# Patient Record
Sex: FEMALE | Race: OTHER | HISPANIC OR LATINO | Employment: FULL TIME | URBAN - METROPOLITAN AREA
[De-identification: names, ages, dates, MRNs, and addresses within clinical notes are randomized per-mention and may not be internally consistent; named-entity substitution may affect disease eponyms.]

---

## 2017-05-23 ENCOUNTER — HOSPITAL ENCOUNTER (EMERGENCY)
Facility: HOSPITAL | Age: 27
Discharge: LEFT BEFORE DISCHARGE | End: 2017-05-23
Admitting: EMERGENCY MEDICINE
Payer: COMMERCIAL

## 2017-05-23 VITALS
RESPIRATION RATE: 14 BRPM | HEART RATE: 97 BPM | TEMPERATURE: 98.2 F | WEIGHT: 121 LBS | SYSTOLIC BLOOD PRESSURE: 94 MMHG | OXYGEN SATURATION: 98 % | DIASTOLIC BLOOD PRESSURE: 59 MMHG

## 2017-05-23 LAB — HCG UR QL: NORMAL

## 2017-05-23 PROCEDURE — 81025 URINE PREGNANCY TEST: CPT

## 2017-06-07 ENCOUNTER — APPOINTMENT (OUTPATIENT)
Dept: PHYSICAL THERAPY | Facility: MEDICAL CENTER | Age: 27
End: 2017-06-07
Payer: COMMERCIAL

## 2017-06-07 PROCEDURE — 97161 PT EVAL LOW COMPLEX 20 MIN: CPT

## 2017-06-12 ENCOUNTER — APPOINTMENT (OUTPATIENT)
Dept: PHYSICAL THERAPY | Facility: MEDICAL CENTER | Age: 27
End: 2017-06-12
Payer: COMMERCIAL

## 2017-06-19 ENCOUNTER — APPOINTMENT (OUTPATIENT)
Dept: PHYSICAL THERAPY | Facility: MEDICAL CENTER | Age: 27
End: 2017-06-19
Payer: COMMERCIAL

## 2017-06-26 ENCOUNTER — APPOINTMENT (OUTPATIENT)
Dept: PHYSICAL THERAPY | Facility: MEDICAL CENTER | Age: 27
End: 2017-06-26
Payer: COMMERCIAL

## 2017-06-27 ENCOUNTER — ALLSCRIPTS OFFICE VISIT (OUTPATIENT)
Dept: OTHER | Facility: OTHER | Age: 27
End: 2017-06-27

## 2017-08-10 ENCOUNTER — HOSPITAL ENCOUNTER (EMERGENCY)
Facility: HOSPITAL | Age: 27
Discharge: HOME | End: 2017-08-10
Admitting: EMERGENCY MEDICINE
Payer: COMMERCIAL

## 2017-08-10 VITALS
DIASTOLIC BLOOD PRESSURE: 60 MMHG | SYSTOLIC BLOOD PRESSURE: 100 MMHG | OXYGEN SATURATION: 98 % | WEIGHT: 116 LBS | HEART RATE: 89 BPM | RESPIRATION RATE: 16 BRPM | TEMPERATURE: 98.3 F

## 2017-08-10 DIAGNOSIS — L73.9 FOLLICULITIS: Primary | ICD-10-CM

## 2017-08-10 PROCEDURE — 99282 EMERGENCY DEPT VISIT SF MDM: CPT

## 2017-08-10 RX ORDER — CLINDAMYCIN HYDROCHLORIDE 300 MG/1
300 CAPSULE ORAL 3 TIMES DAILY
Qty: 30 CAPSULE | Refills: 0 | Status: SHIPPED | OUTPATIENT
Start: 2017-08-10 | End: 2017-08-20

## 2017-08-10 RX ORDER — NAPROXEN 500 MG/1
500 TABLET ORAL 2 TIMES DAILY WITH MEALS
Qty: 20 TABLET | Refills: 0 | Status: SHIPPED | OUTPATIENT
Start: 2017-08-10 | End: 2017-11-22 | Stop reason: ALTCHOICE

## 2017-08-10 RX ORDER — BACITRACIN ZINC AND POLYMYXIN B SULFATE 500; 1000 [USP'U]/G; [USP'U]/G
OINTMENT TOPICAL 2 TIMES DAILY
Qty: 15 G | Refills: 0 | Status: SHIPPED | OUTPATIENT
Start: 2017-08-10 | End: 2017-11-22 | Stop reason: ALTCHOICE

## 2017-11-22 ENCOUNTER — HOSPITAL ENCOUNTER (EMERGENCY)
Facility: HOSPITAL | Age: 27
Discharge: HOME | End: 2017-11-22
Attending: EMERGENCY MEDICINE | Admitting: EMERGENCY MEDICINE
Payer: COMMERCIAL

## 2017-11-22 VITALS
OXYGEN SATURATION: 97 % | WEIGHT: 104 LBS | HEART RATE: 113 BPM | SYSTOLIC BLOOD PRESSURE: 117 MMHG | TEMPERATURE: 97.6 F | RESPIRATION RATE: 16 BRPM | DIASTOLIC BLOOD PRESSURE: 77 MMHG

## 2017-11-22 DIAGNOSIS — J20.9 ACUTE BRONCHITIS: Primary | ICD-10-CM

## 2017-11-22 LAB — S PYO AG THROAT QL: NEGATIVE

## 2017-11-22 PROCEDURE — 94640 AIRWAY INHALATION TREATMENT: CPT

## 2017-11-22 PROCEDURE — 87430 STREP A AG IA: CPT | Performed by: EMERGENCY MEDICINE

## 2017-11-22 PROCEDURE — 99283 EMERGENCY DEPT VISIT LOW MDM: CPT

## 2017-11-22 PROCEDURE — 87070 CULTURE OTHR SPECIMN AEROBIC: CPT | Performed by: EMERGENCY MEDICINE

## 2017-11-22 RX ORDER — ALBUTEROL SULFATE 2.5 MG/3ML
5 SOLUTION RESPIRATORY (INHALATION) ONCE
Status: COMPLETED | OUTPATIENT
Start: 2017-11-22 | End: 2017-11-22

## 2017-11-22 RX ORDER — ALBUTEROL SULFATE 90 UG/1
2 AEROSOL, METERED RESPIRATORY (INHALATION) EVERY 4 HOURS PRN
Qty: 1 INHALER | Refills: 0 | Status: SHIPPED | OUTPATIENT
Start: 2017-11-22 | End: 2020-03-06 | Stop reason: ALTCHOICE

## 2017-11-22 RX ORDER — PROMETHAZINE HYDROCHLORIDE AND CODEINE PHOSPHATE 6.25; 1 MG/5ML; MG/5ML
5 SYRUP ORAL EVERY 6 HOURS PRN
Qty: 120 ML | Refills: 0 | Status: SHIPPED | OUTPATIENT
Start: 2017-11-22 | End: 2017-12-02

## 2017-11-22 RX ORDER — CETIRIZINE HYDROCHLORIDE 10 MG/1
10 TABLET ORAL DAILY
Qty: 30 TABLET | Refills: 0 | Status: SHIPPED | OUTPATIENT
Start: 2017-11-22 | End: 2021-04-23

## 2017-11-22 RX ORDER — FLUTICASONE PROPIONATE 50 MCG
1 SPRAY, SUSPENSION (ML) NASAL DAILY
Qty: 16 G | Refills: 0 | Status: SHIPPED | OUTPATIENT
Start: 2017-11-22 | End: 2021-04-23

## 2017-11-22 RX ADMIN — ALBUTEROL SULFATE 5 MG: 2.5 SOLUTION RESPIRATORY (INHALATION) at 07:39

## 2017-11-22 RX ADMIN — DEXAMETHASONE SODIUM PHOSPHATE 10 MG: 10 INJECTION, SOLUTION INTRAMUSCULAR; INTRAVENOUS at 07:38

## 2017-11-22 RX ADMIN — IPRATROPIUM BROMIDE 0.5 MG: 0.5 SOLUTION RESPIRATORY (INHALATION) at 07:39

## 2017-11-22 NOTE — DISCHARGE INSTRUCTIONS
Acute Bronchitis   WHAT YOU NEED TO KNOW:   Acute bronchitis is swelling and irritation in the air passages of your lungs  This irritation may cause you to cough or have other breathing problems  Acute bronchitis often starts because of another illness, such as a cold or the flu  The illness spreads from your nose and throat to your windpipe and airways  Bronchitis is often called a chest cold  Acute bronchitis lasts about 3 to 6 weeks and is usually not a serious illness  Your cough can last for several weeks  DISCHARGE INSTRUCTIONS:   Return to the emergency department if:   · You cough up blood  · Your lips or fingernails turn blue  · You feel like you are not getting enough air when you breathe  Contact your healthcare provider if:   · You have a fever  · Your breathing problems do not go away or get worse  · Your cough does not get better within 4 weeks  · You have questions or concerns about your condition or care  · Avoid irritants in the air  Avoid chemicals, fumes, and dust  Wear a face mask if you must work around dust or fumes  Stay inside on days when air pollution levels are high  If you have allergies, stay inside when pollen counts are high  Do not use aerosol products, such as spray-on deodorant, bug spray, and hair spray  · Do not smoke or be around others who smoke  Nicotine and other chemicals in cigarettes and cigars damages the cilia that move mucus out of your lungs  Ask your healthcare provider for information if you currently smoke and need help to quit  E-cigarettes or smokeless tobacco still contain nicotine  Talk to your healthcare provider before you use these products  · Drink liquids as directed  Liquids help keep your air passages moist and help you cough up mucus  You may need to drink more liquids when you have acute bronchitis  Ask how much liquid to drink each day and which liquids are best for you  · Use a humidifier or vaporizer    Use a cool mist humidifier or a vaporizer to increase air moisture in your home  This may make it easier for you to breathe and help decrease your cough  ·   · Decongestants  help loosen mucus in your lungs and make it easier to cough up  This can help you breathe easier  · Cough suppressants  decrease your urge to cough  If your cough produces mucus, do not take a cough suppressant unless your healthcare provider tells you to  Your healthcare provider may suggest that you take a cough suppressant at night so you can rest     · Inhalers  An inhaler gives you medicine to open your airways

## 2017-11-24 LAB — BACTERIA THROAT CULT: NORMAL

## 2017-12-04 ENCOUNTER — HOSPITAL ENCOUNTER (EMERGENCY)
Facility: HOSPITAL | Age: 27
Discharge: HOME | End: 2017-12-04
Attending: EMERGENCY MEDICINE | Admitting: EMERGENCY MEDICINE
Payer: COMMERCIAL

## 2017-12-04 ENCOUNTER — APPOINTMENT (EMERGENCY)
Dept: CT IMAGING | Facility: HOSPITAL | Age: 27
End: 2017-12-04
Payer: COMMERCIAL

## 2017-12-04 VITALS
WEIGHT: 118.17 LBS | DIASTOLIC BLOOD PRESSURE: 70 MMHG | SYSTOLIC BLOOD PRESSURE: 140 MMHG | HEART RATE: 62 BPM | OXYGEN SATURATION: 100 % | RESPIRATION RATE: 15 BRPM | TEMPERATURE: 98 F

## 2017-12-04 DIAGNOSIS — R10.9 ACUTE ABDOMINAL PAIN: Primary | ICD-10-CM

## 2017-12-04 DIAGNOSIS — M89.9 BONE LESION: ICD-10-CM

## 2017-12-04 DIAGNOSIS — N39.0 ACUTE URINARY TRACT INFECTION: ICD-10-CM

## 2017-12-04 LAB
ALBUMIN SERPL BCP-MCNC: 3.9 G/DL (ref 3.5–5)
ALP SERPL-CCNC: 56 U/L (ref 46–116)
ALT SERPL W P-5'-P-CCNC: 17 U/L (ref 12–78)
ANION GAP SERPL CALCULATED.3IONS-SCNC: 6 MMOL/L (ref 4–13)
AST SERPL W P-5'-P-CCNC: 10 U/L (ref 5–45)
BACTERIA UR QL AUTO: ABNORMAL /HPF
BASOPHILS # BLD AUTO: 0.04 THOUSANDS/ΜL (ref 0–0.1)
BASOPHILS NFR BLD AUTO: 1 % (ref 0–1)
BILIRUB SERPL-MCNC: 0.52 MG/DL (ref 0.2–1)
BILIRUB UR QL STRIP: ABNORMAL
BUN SERPL-MCNC: 11 MG/DL (ref 5–25)
CALCIUM SERPL-MCNC: 9.3 MG/DL (ref 8.3–10.1)
CHLORIDE SERPL-SCNC: 106 MMOL/L (ref 100–108)
CLARITY UR: ABNORMAL
CO2 SERPL-SCNC: 26 MMOL/L (ref 21–32)
COLOR UR: YELLOW
CREAT SERPL-MCNC: 0.67 MG/DL (ref 0.6–1.3)
EOSINOPHIL # BLD AUTO: 0.16 THOUSAND/ΜL (ref 0–0.61)
EOSINOPHIL NFR BLD AUTO: 2 % (ref 0–6)
ERYTHROCYTE [DISTWIDTH] IN BLOOD BY AUTOMATED COUNT: 13.5 % (ref 11.6–15.1)
EXT PREG TEST URINE: NORMAL
GFR SERPL CREATININE-BSD FRML MDRD: 123 ML/MIN/1.73SQ M
GLUCOSE SERPL-MCNC: 89 MG/DL (ref 65–140)
GLUCOSE UR STRIP-MCNC: NEGATIVE MG/DL
HCT VFR BLD AUTO: 39.9 % (ref 34.8–46.1)
HGB BLD-MCNC: 13.6 G/DL (ref 11.5–15.4)
HGB UR QL STRIP.AUTO: ABNORMAL
KETONES UR STRIP-MCNC: NEGATIVE MG/DL
LEUKOCYTE ESTERASE UR QL STRIP: ABNORMAL
LIPASE SERPL-CCNC: 272 U/L (ref 73–393)
LYMPHOCYTES # BLD AUTO: 2.09 THOUSANDS/ΜL (ref 0.6–4.47)
LYMPHOCYTES NFR BLD AUTO: 28 % (ref 14–44)
MCH RBC QN AUTO: 33.9 PG (ref 26.8–34.3)
MCHC RBC AUTO-ENTMCNC: 34.1 G/DL (ref 31.4–37.4)
MCV RBC AUTO: 100 FL (ref 82–98)
MONOCYTES # BLD AUTO: 0.43 THOUSAND/ΜL (ref 0.17–1.22)
MONOCYTES NFR BLD AUTO: 6 % (ref 4–12)
NEUTROPHILS # BLD AUTO: 4.84 THOUSANDS/ΜL (ref 1.85–7.62)
NEUTS SEG NFR BLD AUTO: 63 % (ref 43–75)
NITRITE UR QL STRIP: NEGATIVE
NON-SQ EPI CELLS URNS QL MICRO: ABNORMAL /HPF
NRBC BLD AUTO-RTO: 0 /100 WBCS
PH UR STRIP.AUTO: 5.5 [PH] (ref 4.5–8)
PLATELET # BLD AUTO: 244 THOUSANDS/UL (ref 149–390)
PMV BLD AUTO: 11.4 FL (ref 8.9–12.7)
POTASSIUM SERPL-SCNC: 4.2 MMOL/L (ref 3.5–5.3)
PROT SERPL-MCNC: 7.2 G/DL (ref 6.4–8.2)
PROT UR STRIP-MCNC: ABNORMAL MG/DL
RBC # BLD AUTO: 4.01 MILLION/UL (ref 3.81–5.12)
RBC #/AREA URNS AUTO: ABNORMAL /HPF
SODIUM SERPL-SCNC: 138 MMOL/L (ref 136–145)
SP GR UR STRIP.AUTO: >=1.03 (ref 1–1.03)
UROBILINOGEN UR QL STRIP.AUTO: 0.2 E.U./DL
WBC # BLD AUTO: 7.56 THOUSAND/UL (ref 4.31–10.16)
WBC #/AREA URNS AUTO: ABNORMAL /HPF

## 2017-12-04 PROCEDURE — 96361 HYDRATE IV INFUSION ADD-ON: CPT

## 2017-12-04 PROCEDURE — 81002 URINALYSIS NONAUTO W/O SCOPE: CPT | Performed by: EMERGENCY MEDICINE

## 2017-12-04 PROCEDURE — 83690 ASSAY OF LIPASE: CPT | Performed by: EMERGENCY MEDICINE

## 2017-12-04 PROCEDURE — 85025 COMPLETE CBC W/AUTO DIFF WBC: CPT | Performed by: EMERGENCY MEDICINE

## 2017-12-04 PROCEDURE — 80053 COMPREHEN METABOLIC PANEL: CPT | Performed by: EMERGENCY MEDICINE

## 2017-12-04 PROCEDURE — 99284 EMERGENCY DEPT VISIT MOD MDM: CPT

## 2017-12-04 PROCEDURE — 81001 URINALYSIS AUTO W/SCOPE: CPT

## 2017-12-04 PROCEDURE — 36415 COLL VENOUS BLD VENIPUNCTURE: CPT | Performed by: EMERGENCY MEDICINE

## 2017-12-04 PROCEDURE — 74177 CT ABD & PELVIS W/CONTRAST: CPT

## 2017-12-04 PROCEDURE — 81025 URINE PREGNANCY TEST: CPT | Performed by: EMERGENCY MEDICINE

## 2017-12-04 PROCEDURE — 96374 THER/PROPH/DIAG INJ IV PUSH: CPT

## 2017-12-04 RX ORDER — KETOROLAC TROMETHAMINE 30 MG/ML
15 INJECTION, SOLUTION INTRAMUSCULAR; INTRAVENOUS ONCE
Status: COMPLETED | OUTPATIENT
Start: 2017-12-04 | End: 2017-12-04

## 2017-12-04 RX ORDER — DICLOFENAC POTASSIUM 50 MG/1
50 TABLET, FILM COATED ORAL 3 TIMES DAILY
Qty: 21 TABLET | Refills: 0 | Status: SHIPPED | OUTPATIENT
Start: 2017-12-04 | End: 2021-04-23

## 2017-12-04 RX ORDER — CEPHALEXIN 250 MG/1
500 CAPSULE ORAL 2 TIMES DAILY
Qty: 40 CAPSULE | Refills: 0 | Status: SHIPPED | OUTPATIENT
Start: 2017-12-04 | End: 2017-12-14

## 2017-12-04 RX ADMIN — SODIUM CHLORIDE 1000 ML: 0.9 INJECTION, SOLUTION INTRAVENOUS at 11:57

## 2017-12-04 RX ADMIN — IOHEXOL 100 ML: 350 INJECTION, SOLUTION INTRAVENOUS at 12:54

## 2017-12-04 RX ADMIN — KETOROLAC TROMETHAMINE 15 MG: 30 INJECTION, SOLUTION INTRAMUSCULAR at 12:03

## 2017-12-04 NOTE — ED PROVIDER NOTES
History  Chief Complaint   Patient presents with    Abdominal Pain     Pt reports right sided abdominal pain that started yesterday  Pt reports nausea      A 26-year-old female presents for evaluation of right-sided abdominal pain  She states-the right upper quadrant and feels like it radiates down the right lateral aspect of her side  The pain is described as a sharp sensation which is constant, without modifying factors, moderate severity  Associated with nausea  She denies vomiting, fevers, chills, chest pain, shortness of breath, cough, URI symptoms, urinary complaints, vaginal bleeding or discharge, diarrhea or constipation, fevers, anorexia  History provided by:  Patient  Abdominal Pain   Associated symptoms: nausea    Associated symptoms: no chest pain, no constipation, no diarrhea, no dysuria, no fatigue, no fever, no hematuria, no shortness of breath, no sore throat, no vaginal bleeding, no vaginal discharge and no vomiting        Prior to Admission Medications   Prescriptions Last Dose Informant Patient Reported? Taking? albuterol (PROVENTIL HFA,VENTOLIN HFA) 90 mcg/act inhaler   No No   Sig: Inhale 2 puffs every 4 (four) hours as needed for wheezing (or cough)   cetirizine (ZyrTEC) 10 mg tablet   No No   Sig: Take 1 tablet by mouth daily   fluticasone (FLONASE) 50 mcg/act nasal spray   No No   Si spray into each nostril daily      Facility-Administered Medications: None       Past Medical History:   Diagnosis Date    Fallopian tube disorder     Known health problems: none     Ovarian cyst        History reviewed  No pertinent surgical history  History reviewed  No pertinent family history  I have reviewed and agree with the history as documented  Social History   Substance Use Topics    Smoking status: Never Smoker    Smokeless tobacco: Never Used    Alcohol use No        Review of Systems   Constitutional: Negative for activity change, appetite change, fatigue and fever  HENT: Negative for congestion, dental problem, ear pain, rhinorrhea and sore throat  Eyes: Negative for pain and redness  Respiratory: Negative for chest tightness, shortness of breath and wheezing  Cardiovascular: Negative for chest pain and palpitations  Gastrointestinal: Positive for abdominal pain and nausea  Negative for blood in stool, constipation, diarrhea and vomiting  Endocrine: Negative for cold intolerance and heat intolerance  Genitourinary: Negative for dysuria, flank pain, frequency, hematuria, vaginal bleeding, vaginal discharge and vaginal pain  Musculoskeletal: Negative for arthralgias and myalgias  Skin: Negative for color change, pallor and rash  Neurological: Negative for weakness and numbness  Hematological: Does not bruise/bleed easily  Psychiatric/Behavioral: Negative for agitation, hallucinations and suicidal ideas  Physical Exam  ED Triage Vitals [12/04/17 1022]   Temperature Pulse Respirations Blood Pressure SpO2   98 °F (36 7 °C) 96 16 106/68 98 %      Temp Source Heart Rate Source Patient Position - Orthostatic VS BP Location FiO2 (%)   Oral Monitor Sitting Right arm --      Pain Score       9           Orthostatic Vital Signs  Vitals:    12/04/17 1022 12/04/17 1255   BP: 106/68 140/70   Pulse: 96 62   Patient Position - Orthostatic VS: Sitting        Physical Exam   Constitutional: She is oriented to person, place, and time  She appears well-developed and well-nourished  HENT:   Mouth/Throat: No oropharyngeal exudate  TMs normal bilaterally no pharyngeal erythema no rhinorrhea nontender palpation of sinuses, normal looking turbinates   Eyes: Conjunctivae and EOM are normal    Neck: Normal range of motion  Neck supple  No meningeal signs   Cardiovascular: Normal rate, regular rhythm, normal heart sounds and intact distal pulses  Pulmonary/Chest: Effort normal and breath sounds normal  No respiratory distress  She has no wheezes  She has no rales  She exhibits no tenderness  Abdominal: Soft  Bowel sounds are normal  She exhibits no distension and no mass  There is tenderness (R sided)  There is guarding  There is no rebound  No hernia  No cvat   Musculoskeletal: Normal range of motion  She exhibits no edema  Lymphadenopathy:     She has no cervical adenopathy  Neurological: She is alert and oriented to person, place, and time  No cranial nerve deficit  Skin: No rash noted  No erythema  No edema   Psychiatric: She has a normal mood and affect  Her behavior is normal    Nursing note and vitals reviewed  ED Medications  Medications   sodium chloride 0 9 % bolus 1,000 mL (1,000 mL Intravenous New Bag 12/4/17 1157)   ketorolac (TORADOL) 30 mg/mL injection 15 mg (15 mg Intravenous Given 12/4/17 1203)   iohexol (OMNIPAQUE) 350 MG/ML injection (MULTI-DOSE) 100 mL (100 mL Intravenous Given 12/4/17 1254)       Diagnostic Studies  Results Reviewed     Procedure Component Value Units Date/Time    Comprehensive metabolic panel [[id number]] Collected:  12/04/17 1156    Lab Status:  Final result Specimen:  Blood from Arm, Left Updated:  12/04/17 1225     Sodium 138 mmol/L      Potassium 4 2 mmol/L      Chloride 106 mmol/L      CO2 26 mmol/L      Anion Gap 6 mmol/L      BUN 11 mg/dL      Creatinine 0 67 mg/dL      Glucose 89 mg/dL      Calcium 9 3 mg/dL      AST 10 U/L      ALT 17 U/L      Alkaline Phosphatase 56 U/L      Total Protein 7 2 g/dL      Albumin 3 9 g/dL      Total Bilirubin 0 52 mg/dL      eGFR 123 ml/min/1 73sq m     Narrative:         National Kidney Disease Education Program recommendations are as follows:  GFR calculation is accurate only with a steady state creatinine  Chronic Kidney disease less than 60 ml/min/1 73 sq  meters  Kidney failure less than 15 ml/min/1 73 sq  meters      Lipase [[id number]]  (Normal) Collected:  12/04/17 1156    Lab Status:  Final result Specimen:  Blood from Arm, Left Updated:  12/04/17 1225     Lipase 272 u/L CBC and differential [[id number]]  (Abnormal) Collected:  12/04/17 1156    Lab Status:  Final result Specimen:  Blood from Arm, Left Updated:  12/04/17 1204     WBC 7 56 Thousand/uL      RBC 4 01 Million/uL      Hemoglobin 13 6 g/dL      Hematocrit 39 9 %       (H) fL      MCH 33 9 pg      MCHC 34 1 g/dL      RDW 13 5 %      MPV 11 4 fL      Platelets 285 Thousands/uL      nRBC 0 /100 WBCs      Neutrophils Relative 63 %      Lymphocytes Relative 28 %      Monocytes Relative 6 %      Eosinophils Relative 2 %      Basophils Relative 1 %      Neutrophils Absolute 4 84 Thousands/µL      Lymphocytes Absolute 2 09 Thousands/µL      Monocytes Absolute 0 43 Thousand/µL      Eosinophils Absolute 0 16 Thousand/µL      Basophils Absolute 0 04 Thousands/µL     POCT urinalysis dipstick [[id number]]  (Abnormal) Resulted:  12/04/17 1033    Lab Status:  Final result Updated:  12/04/17 1202    POCT pregnancy, urine [[id number]]  (Normal) Resulted:  12/04/17 1033    Lab Status:  Final result Updated:  12/04/17 1202     EXT PREG TEST UR (Ref: Negative) HCG = neg (-)    Urine Microscopic [[id number]]  (Abnormal) Collected:  12/04/17 1050    Lab Status:  Final result Specimen:  Urine from Urine, Clean Catch Updated:  12/04/17 1057     RBC, UA 2-4 (A) /hpf      WBC, UA 4-10 (A) /hpf      Epithelial Cells Moderate (A) /hpf      Bacteria, UA Occasional /hpf     ED Urine Macroscopic [[id number]]  (Abnormal) Collected:  12/04/17 1050    Lab Status:  Final result Specimen:  Urine Updated:  12/04/17 1033     Color, UA Yellow     Clarity, UA Cloudy     pH, UA 5 5     Leukocytes, UA Small (A)     Nitrite, UA Negative     Protein, UA Trace (A) mg/dl      Glucose, UA Negative mg/dl      Ketones, UA Negative mg/dl      Urobilinogen, UA 0 2 E U /dl      Bilirubin, UA Interference- unable to analyze (A)     Blood, UA Large (A)     Specific Gravity, UA >=1 030    Narrative:       CLIN[facility]K RESULT                 CT abdomen pelvis with contrast   ED Interpretation by Carole Gu MD (12/04 3024)   No acute CT of normality in the abdomen or pelvis to account for the patient's symptoms   Normal appendix  Incidentally discovered approximately 4 2 cm proximal right femoral metaphyseal benign-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   The large size of the lesion suggests possible increased risk of pathologic fracture   Orthopedic    follow-up and follow-up right femur radiographs are recommended  Final Result by Joe Barajas MD (12/04 1317)      No acute CT of normality in the abdomen or pelvis to account for the patient's symptoms  Normal appendix  Incidentally discovered approximately 4 2 cm proximal right femoral metaphyseal benign-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  The large size of the lesion suggests possible increased risk of pathologic fracture  Orthopedic    follow-up and follow-up right femur radiographs are recommended  Workstation performed: [id number]                    Procedures  Procedures       Phone Contacts  ED Phone Contact    ED Course  ED Course as of Dec 04 1342   Mon Dec 04, 2017   1333 Workup reviewed  Patient will be treated for pyelonephritis given symptoms and findings on urine dip  Patient will be treated symptomatically for this  Patient updated on the lesions seen in her pelvis and instructed to follow up with orthopedics and have repeat imaging done as an outpatient  MDM  Number of Diagnoses or Management Options  Diagnosis management comments: Right-sided abdominal pain-will do urine dip, abdominal labs, urine pregnancy, CT abdomen pelvis read acute intra-abdominal pathology, p r n  pain medications      CritCare Time    Disposition  Final diagnoses:   Acute abdominal pain   Acute urinary tract infection   Bone lesion - femur     Time reflects when diagnosis was documented in both MDM as applicable and the Disposition within this note     Time User Action Codes Description Comment    12/4/2017  1:35 PM Marrion Squire Add [R10 9] Acute abdominal pain     12/4/2017  1:35 PM Isidoro Shaverne Erinn Add [N39 0] Acute urinary tract infection     12/4/2017  1:36 PM Marrion Squire Add [M89 9] Bone lesion     12/4/2017  1:38 PM Marrion Squire Modify [M89 9] Bone lesion femur      ED Disposition     ED Disposition Condition Comment    Discharge  Lisa [place] discharge to home/self care  Condition at discharge: Good        Follow-up Information     Follow up With Specialties Details Why R Bessy rCocker 72, CRNP Nurse Practitioner Schedule an appointment as soon as possible for a visit in 2 days  [address] [place]      [address] Orth[facility] Surgery Call today  [facility] [address] [phone]        Patient's Medications   Discharge Prescriptions    CEPHALEXIN (KEFLEX) 250 MG CAPSULE    Take 2 capsules by mouth 2 (two) times a day for 10 days       Start Date: 12/4/2017 End Date: 12/14/2017       Order Dose: 500 mg       Quantity: 40 capsule    Refills: 0    DICLOFENAC POTASSIUM (CATAFLAM) 50 MG TABLET    Take 1 tablet by mouth 3 (three) times a day for 7 days       Start Date: 12/4/2017 End Date: 12/11/2017       Order Dose: 50 mg       Quantity: 21 tablet    Refills: 0     No discharge procedures on file      ED Provider  Electronically Signed by           Army Mary MD  12/04/17 8406

## 2017-12-04 NOTE — DISCHARGE INSTRUCTIONS
Please follow up with orthopedics for further evaluation of bone lesion on your femur including femur x-rays    Acute Abdominal Pain   WHAT YOU NEED TO KNOW:   The cause of your abdominal pain may not be found  If a cause is found, treatment will depend on what the cause is  DISCHARGE INSTRUCTIONS:   Seek care immediately if:   · You vomit blood or cannot stop vomiting  · You have blood in your bowel movement or it looks like tar  · You have bleeding from your rectum  · Your abdomen is larger than usual, more painful, and hard  · You have severe pain in your abdomen  · You stop passing gas and having bowel movements  · You feel weak, dizzy, or faint  Contact your healthcare provider if:   · You have a fever  · You have new signs and symptoms  · Your symptoms do not get better with treatment  · You have questions or concerns about your condition or care  Medicines  may be given to decrease pain, treat an infection, and manage your symptoms  Take your medicine as directed  Call your healthcare provider if you think your medicine is not helping or if you have side effects  Tell him if you are allergic to any medicine  Keep a list of the medicines, vitamins, and herbs you take  Include the amounts, and when and why you take them  Bring the list or the pill bottles to follow-up visits  Carry your medicine list with you in case of an emergency  Manage your symptoms:   · Apply heat  on your abdomen for 20 to 30 minutes every 2 hours for as many days as directed  Heat helps decrease pain and muscle spasms  · Manage your stress  Stress may cause abdominal pain  Your healthcare provider may recommend relaxation techniques and deep breathing exercises to help decrease your stress  Your healthcare provider may recommend you talk to someone about your stress or anxiety, such as a counselor or a trusted friend  Get plenty of sleep and exercise regularly       · Limit or do not drink alcohol  Alcohol can make your abdominal pain worse  Ask your healthcare provider if it is safe for you to drink alcohol  Also ask how much is safe for you to drink  · Do not smoke  Nicotine and other chemicals in cigarettes can damage your esophagus and stomach  Ask your healthcare provider for information if you currently smoke and need help to quit  E-cigarettes or smokeless tobacco still contain nicotine  Talk to your healthcare provider before you use these products  Make changes to the food you eat as directed:  Do not eat foods that cause abdominal pain or other symptoms  Eat small meals more often  · Eat more high-fiber foods if you are constipated  High-fiber foods include fruits, vegetables, whole-grain foods, and legumes  · Do not eat foods that cause gas if you have bloating  Examples include broccoli, cabbage, and cauliflower  Do not drink soda or carbonated drinks, because these may also cause gas  · Do not eat foods or drinks that contain sorbitol or fructose if you have diarrhea and bloating  Some examples are fruit juices, candy, jelly, and sugar-free gum  · Do not eat high-fat foods, such as fried foods, cheeseburgers, hot dogs, and desserts  · Limit or do not drink caffeine  Caffeine may make symptoms, such as heart burn or nausea, worse  · Drink plenty of liquids to prevent dehydration from diarrhea or vomiting  Ask your healthcare provider how much liquid to drink each day and which liquids are best for you  Follow up with your healthcare provider as directed:  Write down your questions so you remember to ask them during your visits  © 2017 [address]  Information is for End User's use only and may not be sold, redistributed or otherwise used for commercial purposes  All illustrations and images included in CareNotes® are the copyrighted property of [employer]  or [employer]uf[employer] Chavez  The above information is an  only   It is not intended as medical advice for individual conditions or treatments  Talk to your doctor, nurse or pharmacist before following any medical regimen to see if it is safe and effective for you  Urinary Tract Infection in Women, Ambulatory Care   GENERAL INFORMATION:   A urinary tract infection (UTI)  is caused by bacteria that get inside your urinary tract  Most bacteria that enter your urinary tract are expelled when you urinate  If the bacteria stay in your urinary tract, you may get an infection  Your urinary tract includes your kidneys, ureters, bladder, and urethra  Urine is made in your kidneys, and it flows from the ureters to the bladder  Urine leaves the bladder through the urethra  A UTI is more common in your lower urinary tract, which includes your bladder and urethra  Common symptoms include the following:   · Urinating more often or waking from sleep to urinate    · Pain or burning when you urinate    · Pain or pressure in your lower abdomen     · Urine that smells bad    · Blood in your urine    · Leaking urine  Seek immediate care for the following symptoms:   · Urinating very little or not at all    · Vomiting    · Shaking chills with a fever    · Side or back pain that gets worse  Treatment for a UTI  may include medicines to treat a bacterial infection  You may also need medicines to decrease pain and burning, or decrease the urge to urinate often  Prevent a UTI:   · Urinate when you feel the urge  Do not hold your urine  Urinate as soon as you feel you have to  · Drink liquids as directed  Ask how much liquid to drink each day and which liquids are best for you  You may need to drink more fluids than usual to help flush out the bacteria  Do not drink alcohol, caffeine, and citrus juices  These can irritate your bladder and increase your symptoms  · Apply heat  on your abdomen for 20 to 30 minutes every 2 hours for as many days as directed   Heat helps decrease discomfort and pressure in your bladder  Follow up with your healthcare provider as directed:  Write down your questions so you remember to ask them during your visits  CARE AGREEMENT:   You have the right to help plan your care  Learn about your health condition and how it may be treated  Discuss treatment options with your caregivers to decide what care you want to receive  You always have the right to refuse treatment  The above information is an  only  It is not intended as medical advice for individual conditions or treatments  Talk to your doctor, nurse or pharmacist before following any medical regimen to see if it is safe and effective for you  © 2014 [address] is for End User's use only and may not be sold, redistributed or otherwise used for commercial purposes  All illustrations and images included in CareNotes® are the copyrighted property of [employer]  or Rufino Chavez  Kidney Infection   WHAT YOU NEED TO KNOW:   A kidney infection, or pyelonephritis, is a bacterial infection  The infection usually starts in your bladder or urethra and moves into your kidney  One or both kidneys may be infected  DISCHARGE INSTRUCTIONS:   Seek care immediately if:   · You have a fever and chills  · You cannot stop vomiting  · You have severe pain in your abdomen, lower back, or sides  Contact your healthcare provider if:   · You continue to have a fever after you take antibiotics for 3 days  · You have pain when you urinate, even after treatment  · Your signs and symptoms return  · You have questions or concerns about your condition or care  Medicines: You may  need any of the following:  · Antibiotics  treat your bacterial infection  · Acetaminophen  decreases pain and fever  It is available without a doctor's order  Ask how much to take and how often to take it  Follow directions   Read the labels of all other medicines you are using to see if they also contain acetaminophen, or ask your doctor or pharmacist  Acetaminophen can cause liver damage if not taken correctly  Do not use more than 4 grams (4,000 milligrams) total of acetaminophen in one day  · NSAIDs , such as ibuprofen, help decrease swelling, pain, and fever  This medicine is available with or without a doctor's order  NSAIDs can cause stomach bleeding or kidney problems in certain people  If you take blood thinner medicine, always ask if NSAIDs are safe for you  Always read the medicine label and follow directions  Do not give these medicines to children under 10months of age without direction from your child's healthcare provider  · Prescription pain medicine  may be given  Ask how to take this medicine safely  · Take your medicine as directed  Contact your healthcare provider if you think your medicine is not helping or if you have side effects  Tell him of her if you are allergic to any medicine  Keep a list of the medicines, vitamins, and herbs you take  Include the amounts, and when and why you take them  Bring the list or the pill bottles to follow-up visits  Carry your medicine list with you in case of an emergency  Drink liquids as directed: You may need to drink extra liquids to help flush your kidneys and urinary system  Water is the best liquid to drink  Ask your healthcare provider how much liquid to drink each day and which liquids are best for you  Urinate as soon as you feel the urge: This will help flush bacteria from your urinary system  Do not wait or hold your urine for too long  Clean your genital area every day with soap and water:  Wipe from front to back after you urinate or have a bowel movement  Wear cotton underwear  Fabrics such as nylon and polyester can stay damp  This can increase your risk for infection  Urinate within 15 minutes after you have sex    Follow up with your healthcare provider as directed:  Write down your questions so you remember to ask them during your visits  © 2017 [address] Information is for End User's use only and may not be sold, redistributed or otherwise used for commercial purposes  All illustrations and images included in CareNotes® are the copyrighted property of [employer]  or [employer]  The above information is an  only  It is not intended as medical advice for individual conditions or treatments  Talk to your doctor, nurse or pharmacist before following any medical regimen to see if it is safe and effective for you

## 2017-12-18 ENCOUNTER — HOSPITAL ENCOUNTER (OUTPATIENT)
Dept: RADIOLOGY | Facility: HOSPITAL | Age: 27
Discharge: HOME | End: 2017-12-18
Payer: COMMERCIAL

## 2017-12-18 ENCOUNTER — GENERIC CONVERSION - ENCOUNTER (OUTPATIENT)
Dept: OTHER | Facility: OTHER | Age: 27
End: 2017-12-18

## 2017-12-18 ENCOUNTER — TRANSCRIBE ORDERS (OUTPATIENT)
Dept: ADMINISTRATIVE | Facility: HOSPITAL | Age: 27
End: 2017-12-18

## 2017-12-18 DIAGNOSIS — R20.2 PARESTHESIA: ICD-10-CM

## 2017-12-18 DIAGNOSIS — R20.2 PARESTHESIA: Primary | ICD-10-CM

## 2017-12-18 DIAGNOSIS — M25.551 RIGHT HIP PAIN: ICD-10-CM

## 2017-12-18 PROCEDURE — 73502 X-RAY EXAM HIP UNI 2-3 VIEWS: CPT

## 2017-12-18 PROCEDURE — 72110 X-RAY EXAM L-2 SPINE 4/>VWS: CPT

## 2018-01-12 NOTE — RESULT NOTES
Verified Results  (Q) ANTI MULLERIAN HORMONE ASSESSR(TM) [id number] 03:01PM Caralyn Alamin     Test Name Result Flag Reference   ANTI MULLERIAN HORMONE$ASSESSR(TM) 5 39 ng/mL     REFERENCE RANGES for AMH/MIS:                             Age         Expected range                                          (ng/mL)         -------------------------------------------         Female:          <14 yrs        0 49-3 15                        14-19 yrs        1 28-16 37                        20-29 yrs        0 76-11 34                        30-39 yrs            <9 24                        40-49 yrs            <4 50                         > 49 yrs            <0 45            Male:            <1  yr         37  67                         1-6  yrs        59  65                         7-11 yrs        40  67                        12-17 yrs           <128 29                         > 17 yrs        1 15-15 23      This test(s) was developed and its performance characteristics   have been determined by [place], [place]  Performance characteristics refer to the analytical  performance of the test  For more information on this test, go   to: [email or web address]     (Q) T4, FREE, DIRECT DIALYSIS AND T4, TOTAL [id number] 03:01PM Caralyn Alamin     Test Name Result Flag Reference   T4, FREE, DIRECT DIALYSIS 0 9 ng/dL  0 8-2 7   Pregnancy Reference Ranges for T4, Free, Direct   Dialysis:     First Trimester:   0 9-2 0 ng/dL  Second Trimester:  0 8-1 5 ng/dL  Third Trimester:   0 8-1 7 ng/dL     This test was developed and its analytical performance  characteristics have been determined by [facility]  It has not been  cleared or approved by FDA  This assay has been validated  pursuant to the CLIA regulations and is used for clinical  purposes     T4, TOTAL 5 5 mcg/dL  4 8-10 4   <1 month: Not Established       1-23 months: 6 0-13 2 mcg/dL        2-12 years: 5 5-12 1 mcg/dL       13-20 years: 5 5-11 1 mcg/dL         >20 years: 4 8-10 4 mcg/dL       Pregnancy     1st Trimester: 6 4-15 2 mcg/dL     2nd Trimester: 7 4-15 2 mcg/dL     3rd Trimester: 7 7-13 8 mcg/dL     All Trimesters          Together: 7 0-14 7 mcg/dL     Conversion factor: 1 mcg/dL = 12 9 nmol/L     (Q) TSH, 3RD GENERATION [id number] 03:01PM Angela First     Test Name Result Flag Reference   TSH 0 67 mIU/L     Reference Range                         > or = 20 Years  0 40-4 50                              Pregnancy Ranges            First trimester    0 26-2 66            Second trimester   0 55-2 73            Third trimester    0 43-2 91     (Q) PROLACTIN [id number] 03:01PM Angela First     Test Name Result Flag Reference   PROLACTIN 5 5 ng/mL     Reference Range   Females          Non-pregnant        3 0-30 0          Pregnant           10 0-209 0          Postmenopausal      2 0-20 0

## 2018-01-14 VITALS
BODY MASS INDEX: 21.71 KG/M2 | HEIGHT: 62 IN | SYSTOLIC BLOOD PRESSURE: 104 MMHG | DIASTOLIC BLOOD PRESSURE: 62 MMHG | WEIGHT: 118 LBS

## 2018-03-30 LAB
CHLAMYDIA TRACHOMATIS DNA SDA, URINE (HISTORICAL): NORMAL
N GONORRHOEAE DNA SDA,URINE (HISTORICAL): NORMAL

## 2018-07-26 ENCOUNTER — OFFICE VISIT (OUTPATIENT)
Dept: FAMILY MEDICINE CLINIC | Facility: CLINIC | Age: 28
End: 2018-07-26
Payer: COMMERCIAL

## 2018-07-26 VITALS
BODY MASS INDEX: 21.19 KG/M2 | HEIGHT: 63 IN | WEIGHT: 119.6 LBS | HEART RATE: 96 BPM | DIASTOLIC BLOOD PRESSURE: 70 MMHG | RESPIRATION RATE: 16 BRPM | SYSTOLIC BLOOD PRESSURE: 100 MMHG | TEMPERATURE: 96.4 F

## 2018-07-26 DIAGNOSIS — Z01.419 ENCNTR FOR GYN EXAM (GENERAL) (ROUTINE) W/O ABN FINDINGS: Primary | ICD-10-CM

## 2018-07-26 DIAGNOSIS — R10.2 PELVIC PAIN: ICD-10-CM

## 2018-07-26 DIAGNOSIS — G47.00 INSOMNIA, UNSPECIFIED TYPE: ICD-10-CM

## 2018-07-26 PROCEDURE — 3725F SCREEN DEPRESSION PERFORMED: CPT | Performed by: NURSE PRACTITIONER

## 2018-07-26 PROCEDURE — 87491 CHLMYD TRACH DNA AMP PROBE: CPT | Performed by: NURSE PRACTITIONER

## 2018-07-26 PROCEDURE — 99395 PREV VISIT EST AGE 18-39: CPT | Performed by: NURSE PRACTITIONER

## 2018-07-26 PROCEDURE — 87591 N.GONORRHOEAE DNA AMP PROB: CPT | Performed by: NURSE PRACTITIONER

## 2018-07-26 NOTE — PATIENT INSTRUCTIONS
Insomnia   WHAT YOU NEED TO KNOW:   What is insomnia? Insomnia is a condition that makes it hard to fall or stay asleep  Lack of sleep can lead to attention or memory problems during the day  You may also be [place], depressed, clumsy, or have headaches  What increases my risk for insomnia? · Older age    · Stress or worry    · A medical condition, such as sleep apnea, GERD, COPD, or asthma    · A mental health condition, such as depression or anxiety    · Blood pressure medicines or antidepressants    · Odd work schedules or frequent travel  How is insomnia diagnosed? Your healthcare provider will ask when your symptoms began and how often you cannot sleep  He will ask if you take any medicines that can cause insomnia, such as blood pressure medicine  He will ask if you have a medical condition, such as GERD, or a mental health condition, such as depression  He may also have you take a survey about your sleep  How is insomnia treated? · Cognitive behavioral therapy (CBT)  helps you find ways to relax, decrease stress, and improve sleep  · Medicines  may help you sleep more regularly or help you feel less anxious  Take them as directed  What can I do to improve my sleep? · Create a sleep schedule  This will help you form a sleep routine  Keep a record of your sleep patterns, and any sleeping problems you have  Bring the record to follow-up visits with healthcare providers  · Do not take naps  Naps could make it hard for you to fall asleep at bedtime  · Keep your bedroom cool, quiet, and dark  Turn on white noise, such as a fan, to help you relax  Do not use your bed for any activity that will keep you awake  Do not read, exercise, eat, or watch TV in your bedroom  · Get up if you do not fall asleep within 20 minutes  Move to another room and do something relaxing until you become sleepy  · Limit caffeine, alcohol, and food to earlier in the day  Only drink caffeine in the morning   Do not drink alcohol within 6 hours of bedtime  Do not eat a heavy meal right before you go to bed  · Exercise regularly  Daily exercise may help you sleep better  Do not exercise within 4 hours of bedtime  When should I contact my healthcare provider? · Your symptoms do not get better, or they get worse  · You begin to use drugs or alcohol to fall asleep  · You have questions or concerns about your condition or care  CARE AGREEMENT:   You have the right to help plan your care  Learn about your health condition and how it may be treated  Discuss treatment options with your caregivers to decide what care you want to receive  You always have the right to refuse treatment  The above information is an  only  It is not intended as medical advice for individual conditions or treatments  Talk to your doctor, nurse or pharmacist before following any medical regimen to see if it is safe and effective for you  © 2017 [employer]  Information is for End User's use only and may not be sold, redistributed or otherwise used for commercial purposes  All illustrations and images included in CareNotes® are the copyrighted property of A [employer] M [employer] Inc  or Rufino Chavez

## 2018-07-26 NOTE — PROGRESS NOTES
Subjective     Clint Flores is a 24 y o   female and is here for routine health maintenance  The patient reports no problems  History of Present Illness     HPI    liliya presents today for her gyn visit  She is having no issues at this time  She is on no birth control at this time  We did discuss prevention  She will think about whether she needs anything at all at this time  She is not interested at the pregnancy at this time  So does the discussion and the decision she will have to make some time  Had testing done about 5 years ago there is 1 block will be into we are not sure which side  Well Adult Physical   Patient here for a Gynecological exam       Diet and Physical Activity  Diet: well balanced diet  Weight concerns: None, patient's BMI is between 18 5-24 9  Exercise: rarely      Depression Screen  PHQ-9 Depression Screening    PHQ-9:    Frequency of the following problems over the past two weeks:       Little interest or pleasure in doing things:  0 - not at all  Feeling down, depressed, or hopeless:  0 - not at all  PHQ-2 Score:  0           History:  LMP: @  Menses regular  yes  Cycle Length 5-6 day every 28 days  Flow normal  Menorrhagia  no  Dysmenorrhea   no  : 0  Para: 0  Breast Fed no  Bottle Fed not asked  Both not asked   Screening  Colononoscopy na  Mammogram na   Pap today  Abnormal pap? no      The following portions of the patient's history were reviewed and updated as appropriate: allergies, current medications, past family history, past medical history, past social history, past surgical history and problem list     Review of Systems     Review of Systems   Constitutional: Negative for activity change, appetite change, fatigue, fever and unexpected weight change  HENT: Negative for congestion, dental problem and sneezing  Eyes: Negative for discharge and visual disturbance  Respiratory: Negative for cough and wheezing      Gastrointestinal: Negative for abdominal pain, constipation, diarrhea, nausea and vomiting  Endocrine: Negative for polydipsia and polyuria  Genitourinary: Negative for dysuria, frequency, urgency and vaginal discharge  Musculoskeletal: Negative for arthralgias  Skin: Negative for rash  Allergic/Immunologic: Negative for environmental allergies and food allergies  Neurological: Negative for headaches  Hematological: Negative for adenopathy  Psychiatric/Behavioral: Negative for behavioral problems and sleep disturbance  The patient is not nervous/anxious  Breast ROS: negative for breast lumps  negative  Self Breast Exam yes  Genito-Urinary ROS: no dysuria, trouble voiding, or hematuria  Hot Flashes not asked  Night Sweats not asked  Vaginal dryness not asked  Insomnia yes  Sexually Active yes  Birth Control Method nothing  Calcium no  Vitamin D not asked  Weight bearing Exercise no  Past Medical History     Past Medical History:   Diagnosis Date    Fallopian tube disorder     Fibroadenoma of left breast 08/28/2008    Known health problems: none     Ovarian cyst        Past Surgical History     Past Surgical History:   Procedure Laterality Date    BREAST SURGERY Left 08/28/2008    lesion excision       Social History     Social History     Social History    Marital status: Single     Spouse name: N/A    Number of children: N/A    Years of education: N/A     Social History Main Topics    Smoking status: Never Smoker    Smokeless tobacco: Never Used    Alcohol use No    Drug use: No    Sexual activity: Not Asked     Other Topics Concern    None     Social History Narrative    None       Family History     History reviewed  No pertinent family history      Current Medications       Current Outpatient Prescriptions:     albuterol (PROVENTIL HFA,VENTOLIN HFA) 90 mcg/act inhaler, Inhale 2 puffs every 4 (four) hours as needed for wheezing (or cough), Disp: 1 Inhaler, Rfl: 0    cetirizine (ZyrTEC) 10 mg tablet, Take 1 tablet by mouth daily, Disp: 30 tablet, Rfl: 0    fluticasone (FLONASE) 50 mcg/act nasal spray, 1 spray into each nostril daily, Disp: 16 g, Rfl: 0    Multiple Vitamins-Minerals (MULTIVITAMIN ADULT PO), Take by mouth, Disp: , Rfl:     diclofenac potassium (CATAFLAM) 50 mg tablet, Take 1 tablet by mouth 3 (three) times a day for 7 days, Disp: 21 tablet, Rfl: 0     Allergies     Allergies   Allergen Reactions    No Active Allergies        Objective     /70 (BP Location: Left arm, Patient Position: Sitting, Cuff Size: Adult)   Pulse 96   Temp (!) 96 4 °F (35 8 °C) (Temporal)   Resp 16   Ht 5' 2 6" (1 59 m)   Wt 54 3 kg (119 lb 9 6 oz)   LMP 07/13/2018   BMI 21 46 kg/m²      Physical Exam   Constitutional: She is oriented to person, place, and time  She appears well-developed and well-nourished  HENT:   Head: Normocephalic  Right Ear: External ear normal    Left Ear: External ear normal    Nose: Nose normal    Eyes: Conjunctivae are normal  Pupils are equal, round, and reactive to light  Right eye exhibits no discharge  Left eye exhibits no discharge  Neck: Normal range of motion  Neck supple  No thyromegaly present  Cardiovascular: Normal rate, regular rhythm and normal heart sounds  No murmur heard  Pulmonary/Chest: Effort normal and breath sounds normal    Abdominal: Soft  Bowel sounds are normal  There is no tenderness  Genitourinary: Vagina normal and uterus normal  No breast swelling, tenderness, discharge or bleeding  There is no rash or tenderness on the right labia  There is no rash or tenderness on the left labia  Cervix exhibits no motion tenderness and no discharge  Right adnexum displays tenderness  Left adnexum displays no tenderness  No tenderness in the vagina  Musculoskeletal: Normal range of motion  Lymphadenopathy:     She has no cervical adenopathy  Right: No inguinal adenopathy present  Left: No inguinal adenopathy present     Neurological: She is alert and oriented to person, place, and time  Skin: Skin is warm  No rash noted  Psychiatric: She has a normal mood and affect  Her behavior is normal          No exam data present    Health Maintenance     Health Maintenance   Topic Date Due    HIV SCREENING  1992    Depression Screening PHQ-9  1992    DTaP,Tdap,and Td Vaccines (1 - Tdap) 11/27/2013    INFLUENZA VACCINE  09/01/2018       There is no immunization history on file for this patient      Assessment/Plan     1Healthy well female  Waiting on PAP/HPV results  Call if she wants to start some form of birthcontrol  2 pelvic pain   U/s ordered  3Insomnia  Start on melatonin    XU Tarango

## 2018-07-27 LAB
CHLAMYDIA DNA CVX QL NAA+PROBE: NORMAL
N GONORRHOEA DNA GENITAL QL NAA+PROBE: NORMAL

## 2018-07-30 ENCOUNTER — TELEPHONE (OUTPATIENT)
Dept: FAMILY MEDICINE CLINIC | Facility: CLINIC | Age: 28
End: 2018-07-30

## 2018-07-30 NOTE — TELEPHONE ENCOUNTER
Sammie from [facility]'s lab called said that the order for the pap that was sent is wrong needs correct code which is lab 698 any ?  Call sammie at [phone]   [place] [address]

## 2018-07-31 ENCOUNTER — TELEPHONE (OUTPATIENT)
Dept: FAMILY MEDICINE CLINIC | Facility: CLINIC | Age: 28
End: 2018-07-31

## 2018-07-31 NOTE — TELEPHONE ENCOUNTER
Call from Binta at [facility] calling to check the status on the order for the pap that was sent is incorrectly  Golden Rogers states that the correct code is lab 4   The order was sent with a [facility] code instead of a [facility] code  Please advise Binta when this is corrected at [phone] thank you

## 2019-05-13 ENCOUNTER — APPOINTMENT (EMERGENCY)
Dept: CT IMAGING | Facility: HOSPITAL | Age: 29
End: 2019-05-13
Payer: COMMERCIAL

## 2019-05-13 ENCOUNTER — HOSPITAL ENCOUNTER (EMERGENCY)
Facility: HOSPITAL | Age: 29
Discharge: HOME | End: 2019-05-13
Attending: EMERGENCY MEDICINE | Admitting: EMERGENCY MEDICINE
Payer: COMMERCIAL

## 2019-05-13 ENCOUNTER — APPOINTMENT (EMERGENCY)
Dept: RADIOLOGY | Facility: HOSPITAL | Age: 29
End: 2019-05-13
Payer: COMMERCIAL

## 2019-05-13 VITALS
BODY MASS INDEX: 21.12 KG/M2 | RESPIRATION RATE: 18 BRPM | DIASTOLIC BLOOD PRESSURE: 58 MMHG | WEIGHT: 117.73 LBS | OXYGEN SATURATION: 99 % | TEMPERATURE: 98.4 F | HEART RATE: 66 BPM | SYSTOLIC BLOOD PRESSURE: 104 MMHG

## 2019-05-13 DIAGNOSIS — S16.1XXA ACUTE STRAIN OF NECK MUSCLE, INITIAL ENCOUNTER: ICD-10-CM

## 2019-05-13 DIAGNOSIS — S80.02XA CONTUSION OF LEFT KNEE, INITIAL ENCOUNTER: ICD-10-CM

## 2019-05-13 DIAGNOSIS — S30.0XXA CONTUSION OF LOWER BACK, INITIAL ENCOUNTER: ICD-10-CM

## 2019-05-13 DIAGNOSIS — S09.90XA CLOSED HEAD INJURY, INITIAL ENCOUNTER: Primary | ICD-10-CM

## 2019-05-13 PROCEDURE — 72100 X-RAY EXAM L-S SPINE 2/3 VWS: CPT

## 2019-05-13 PROCEDURE — 72125 CT NECK SPINE W/O DYE: CPT

## 2019-05-13 PROCEDURE — 99284 EMERGENCY DEPT VISIT MOD MDM: CPT

## 2019-05-13 PROCEDURE — 70450 CT HEAD/BRAIN W/O DYE: CPT

## 2019-05-13 PROCEDURE — 73564 X-RAY EXAM KNEE 4 OR MORE: CPT

## 2019-05-13 PROCEDURE — 99284 EMERGENCY DEPT VISIT MOD MDM: CPT | Performed by: EMERGENCY MEDICINE

## 2019-05-13 RX ORDER — CYCLOBENZAPRINE HCL 10 MG
10 TABLET ORAL 2 TIMES DAILY PRN
Qty: 15 TABLET | Refills: 0 | Status: SHIPPED | OUTPATIENT
Start: 2019-05-13 | End: 2020-03-06 | Stop reason: ALTCHOICE

## 2019-05-13 RX ORDER — IBUPROFEN 400 MG/1
400 TABLET ORAL EVERY 8 HOURS PRN
Qty: 20 TABLET | Refills: 0 | Status: SHIPPED | OUTPATIENT
Start: 2019-05-13 | End: 2020-03-06 | Stop reason: ALTCHOICE

## 2019-05-13 RX ORDER — ACETAMINOPHEN 325 MG/1
975 TABLET ORAL ONCE
Status: COMPLETED | OUTPATIENT
Start: 2019-05-13 | End: 2019-05-13

## 2019-05-13 RX ADMIN — ACETAMINOPHEN 975 MG: 325 TABLET ORAL at 11:59

## 2019-09-10 ENCOUNTER — HOSPITAL ENCOUNTER (EMERGENCY)
Facility: HOSPITAL | Age: 29
Discharge: HOME | End: 2019-09-10
Attending: EMERGENCY MEDICINE

## 2019-09-10 VITALS
WEIGHT: 123.02 LBS | HEART RATE: 106 BPM | RESPIRATION RATE: 18 BRPM | SYSTOLIC BLOOD PRESSURE: 114 MMHG | OXYGEN SATURATION: 97 % | DIASTOLIC BLOOD PRESSURE: 66 MMHG | BODY MASS INDEX: 22.07 KG/M2 | TEMPERATURE: 100.4 F

## 2019-09-10 DIAGNOSIS — J02.9 PHARYNGITIS: Primary | ICD-10-CM

## 2019-09-10 PROCEDURE — 99282 EMERGENCY DEPT VISIT SF MDM: CPT

## 2019-09-10 PROCEDURE — 99284 EMERGENCY DEPT VISIT MOD MDM: CPT | Performed by: PHYSICIAN ASSISTANT

## 2019-09-10 RX ORDER — AMOXICILLIN 500 MG/1
500 TABLET, FILM COATED ORAL 2 TIMES DAILY
Qty: 20 TABLET | Refills: 0 | Status: SHIPPED | OUTPATIENT
Start: 2019-09-10 | End: 2019-09-20

## 2019-09-10 NOTE — ED PROVIDER NOTES
History  Chief Complaint   Patient presents with    Sore Throat     Sore throat and fever for 2 days     28y  o female with PMH of fibroadenoma and ovarian cyst presents to the ER for sore throat and subjective fever for 2 days  Patient took Tylenol yesterday for fever  She describes her pain as sharp and radiating to her ears  She rates her pain 10/10 and states it is constant  She denies sick contacts or recent travel  She denies chills, rhinorrhea/congestion, chest pain, dyspnea, N/V/D, abdominal pain, weakness or paresthesias  History provided by:  Patient   used: No        Prior to Admission Medications   Prescriptions Last Dose Informant Patient Reported? Taking? Multiple Vitamins-Minerals (MULTIVITAMIN ADULT PO)  Self Yes No   Sig: Take by mouth   albuterol (PROVENTIL HFA,VENTOLIN HFA) 90 mcg/act inhaler  Self No No   Sig: Inhale 2 puffs every 4 (four) hours as needed for wheezing (or cough)   Patient not taking: Reported on 2019   cetirizine (ZyrTEC) 10 mg tablet  Self No No   Sig: Take 1 tablet by mouth daily   cyclobenzaprine (FLEXERIL) 10 mg tablet   No No   Sig: Take 1 tablet (10 mg total) by mouth 2 (two) times a day as needed for muscle spasms   diclofenac potassium (CATAFLAM) 50 mg tablet   No No   Sig: Take 1 tablet by mouth 3 (three) times a day for 7 days   fluticasone (FLONASE) 50 mcg/act nasal spray  Self No No   Si spray into each nostril daily   ibuprofen (MOTRIN) 400 mg tablet   No No   Sig: Take 1 tablet (400 mg total) by mouth every 8 (eight) hours as needed for moderate pain      Facility-Administered Medications: None       Past Medical History:   Diagnosis Date    Fallopian tube disorder     Fibroadenoma of left breast 2008    Known health problems: none     Ovarian cyst        Past Surgical History:   Procedure Laterality Date    BREAST SURGERY Left 2008    lesion excision       History reviewed  No pertinent family history    I have reviewed and agree with the history as documented  Social History     Tobacco Use    Smoking status: Never Smoker    Smokeless tobacco: Never Used   Substance Use Topics    Alcohol use: Yes     Comment: occ   Drug use: No        Review of Systems   Constitutional: Positive for fever  Negative for activity change, appetite change and chills  HENT: Positive for ear pain and sore throat  Negative for congestion, drooling, ear discharge, facial swelling and rhinorrhea  Eyes: Negative for redness  Respiratory: Negative for cough and shortness of breath  Cardiovascular: Negative for chest pain  Gastrointestinal: Negative for abdominal pain, diarrhea, nausea and vomiting  Skin: Negative for rash  Allergic/Immunologic: Negative for food allergies  Neurological: Negative for weakness and numbness  Physical Exam  Physical Exam   Constitutional:  Non-toxic appearance  No distress  HENT:   Head: Normocephalic and atraumatic  Right Ear: Tympanic membrane, external ear and ear canal normal  No drainage, swelling or tenderness  No foreign bodies  Tympanic membrane is not erythematous  No hemotympanum  Left Ear: Tympanic membrane, external ear and ear canal normal  No drainage, swelling or tenderness  No foreign bodies  Tympanic membrane is not erythematous  No hemotympanum  Nose: Nose normal    Mouth/Throat: Uvula is midline and mucous membranes are normal  No uvula swelling  Posterior oropharyngeal edema and posterior oropharyngeal erythema present  No tonsillar abscesses  Tonsillar exudate  Neck: Normal range of motion and phonation normal  Neck supple  No tracheal deviation present  Cardiovascular: Normal rate, regular rhythm, S1 normal, S2 normal and normal heart sounds  Exam reveals no gallop and no friction rub  No murmur heard  Pulmonary/Chest: Effort normal and breath sounds normal  No respiratory distress  She has no decreased breath sounds  She has no wheezes   She has no rhonchi  She has no rales  She exhibits no tenderness  Neurological: She is alert  GCS eye subscore is 4  GCS verbal subscore is 5  GCS motor subscore is 6  Skin: Skin is warm and dry  No rash noted  Psychiatric: She has a normal mood and affect  Nursing note and vitals reviewed  Vital Signs  ED Triage Vitals [09/10/19 0702]   Temperature Pulse Respirations Blood Pressure SpO2   100 4 °F (38 °C) (!) 112 18 114/66 97 %      Temp Source Heart Rate Source Patient Position - Orthostatic VS BP Location FiO2 (%)   Oral Monitor -- -- --      Pain Score       4           Vitals:    09/10/19 0702 09/10/19 0717   BP: 114/66    Pulse: (!) 112 (!) 106         Visual Acuity      ED Medications  Medications - No data to display    Diagnostic Studies  Results Reviewed     None                 No orders to display              Procedures  Procedures       ED Course                               MDM  Number of Diagnoses or Management Options  Pharyngitis: new and does not require workup  Diagnosis management comments: DDX consists of but not limited to: viral syndrome, strep, abscess, mono    At discharge, I instructed the patient to:  -follow up with pcp  -take Tylenol or Motrin for pain or fever  -take Amoxicillin as prescribed  -rest and drink plenty of fluids  -return to the ER if symptoms worsened or new symptoms arose  Patient agreed to this plan and was stable at time of discharge  Patient Progress  Patient progress: stable      Disposition  Final diagnoses:   Pharyngitis     Time reflects when diagnosis was documented in both MDM as applicable and the Disposition within this note     Time User Action Codes Description Comment    9/10/2019  7:16 AM Marcos LEGER Add [J02 9] Pharyngitis       ED Disposition     ED Disposition Condition Date/Time Comment    Discharge Stable Tue Sep 10, 2019  7:16 AM Tabatha Castro discharge to home/self care              Follow-up Information     Follow up With Specialties Details Why Contact Info    XU Benitez Nurse Practitioner Schedule an appointment as soon as possible for a visit    [place] [address]  [phone]            Patient's Medications   Discharge Prescriptions    AMOXICILLIN (AMOXIL) 500 MG TABLET    Take 1 tablet (500 mg total) by mouth 2 (two) times a day for 10 days       Start Date: 9/10/2019 End Date: 9/20/2019       Order Dose: 500 mg       Quantity: 20 tablet    Refills: 0     No discharge procedures on file      ED Provider  Electronically Signed by           Radha Biswas PA-C  09/10/19 7227

## 2019-09-10 NOTE — DISCHARGE INSTRUCTIONS
DISCHARGE INSTRUCTIONS:    FOLLOW UP WITH YOUR PRIMARY CARE PROVIDER OR THE [address] [place] [address]  MAKE AN APPOINTMENT TO BE SEEN  TAKE TYLENOL OR MOTRIN FOR PAIN OR FEVER  TAKE AMOXICILLIN AS PRESCRIBED  IF RASH, SHORTNESS OF BREATH OR TROUBLE SWALLOWING, STOP TAKING THE MEDICATION AND BE SEEN  REST AND DRINK PLENTY OF FLUIDS  IF SYMPTOMS WORSEN OR NEW SYMPTOMS ARISE, RETURN TO THE ER TO BE SEEN

## 2019-12-16 DIAGNOSIS — Z23 NEED FOR VACCINATION: ICD-10-CM

## 2019-12-16 DIAGNOSIS — Z11.4 SCREENING FOR HIV (HUMAN IMMUNODEFICIENCY VIRUS): Primary | ICD-10-CM

## 2019-12-18 ENCOUNTER — OFFICE VISIT (OUTPATIENT)
Dept: FAMILY MEDICINE CLINIC | Facility: CLINIC | Age: 29
End: 2019-12-18
Payer: COMMERCIAL

## 2019-12-18 VITALS
RESPIRATION RATE: 16 BRPM | HEART RATE: 90 BPM | SYSTOLIC BLOOD PRESSURE: 108 MMHG | BODY MASS INDEX: 24.7 KG/M2 | OXYGEN SATURATION: 96 % | DIASTOLIC BLOOD PRESSURE: 76 MMHG | HEIGHT: 61 IN | TEMPERATURE: 98.8 F | WEIGHT: 130.8 LBS

## 2019-12-18 DIAGNOSIS — F51.01 PRIMARY INSOMNIA: ICD-10-CM

## 2019-12-18 DIAGNOSIS — Z00.00 ANNUAL PHYSICAL EXAM: Primary | ICD-10-CM

## 2019-12-18 DIAGNOSIS — Z23 NEED FOR VACCINATION: ICD-10-CM

## 2019-12-18 DIAGNOSIS — Z11.4 SCREENING FOR HIV (HUMAN IMMUNODEFICIENCY VIRUS): ICD-10-CM

## 2019-12-18 DIAGNOSIS — Z63.4 DEATH OF FAMILY MEMBER: ICD-10-CM

## 2019-12-18 DIAGNOSIS — N97.9 FEMALE INFERTILITY: ICD-10-CM

## 2019-12-18 LAB
ALBUMIN SERPL BCP-MCNC: 4.2 G/DL (ref 3.5–5)
ALP SERPL-CCNC: 60 U/L (ref 46–116)
ALT SERPL W P-5'-P-CCNC: 22 U/L (ref 12–78)
ANION GAP SERPL CALCULATED.3IONS-SCNC: 8 MMOL/L (ref 4–13)
AST SERPL W P-5'-P-CCNC: 10 U/L (ref 5–45)
BASOPHILS # BLD AUTO: 0.09 THOUSANDS/ΜL (ref 0–0.1)
BASOPHILS NFR BLD AUTO: 1 % (ref 0–1)
BILIRUB SERPL-MCNC: 0.44 MG/DL (ref 0.2–1)
BUN SERPL-MCNC: 12 MG/DL (ref 5–25)
CALCIUM SERPL-MCNC: 9 MG/DL (ref 8.3–10.1)
CHLORIDE SERPL-SCNC: 108 MMOL/L (ref 100–108)
CHOLEST SERPL-MCNC: 184 MG/DL (ref 50–200)
CO2 SERPL-SCNC: 23 MMOL/L (ref 21–32)
CREAT SERPL-MCNC: 0.76 MG/DL (ref 0.6–1.3)
EOSINOPHIL # BLD AUTO: 0.32 THOUSAND/ΜL (ref 0–0.61)
EOSINOPHIL NFR BLD AUTO: 5 % (ref 0–6)
ERYTHROCYTE [DISTWIDTH] IN BLOOD BY AUTOMATED COUNT: 13.7 % (ref 11.6–15.1)
GFR SERPL CREATININE-BSD FRML MDRD: 108 ML/MIN/1.73SQ M
GLUCOSE P FAST SERPL-MCNC: 70 MG/DL (ref 65–99)
HCT VFR BLD AUTO: 45.2 % (ref 34.8–46.1)
HDLC SERPL-MCNC: 50 MG/DL
HGB BLD-MCNC: 14.5 G/DL (ref 11.5–15.4)
IMM GRANULOCYTES # BLD AUTO: 0.02 THOUSAND/UL (ref 0–0.2)
IMM GRANULOCYTES NFR BLD AUTO: 0 % (ref 0–2)
LDLC SERPL CALC-MCNC: 124 MG/DL (ref 0–100)
LYMPHOCYTES # BLD AUTO: 1.79 THOUSANDS/ΜL (ref 0.6–4.47)
LYMPHOCYTES NFR BLD AUTO: 28 % (ref 14–44)
MCH RBC QN AUTO: 33.4 PG (ref 26.8–34.3)
MCHC RBC AUTO-ENTMCNC: 32.1 G/DL (ref 31.4–37.4)
MCV RBC AUTO: 104 FL (ref 82–98)
MONOCYTES # BLD AUTO: 0.49 THOUSAND/ΜL (ref 0.17–1.22)
MONOCYTES NFR BLD AUTO: 8 % (ref 4–12)
NEUTROPHILS # BLD AUTO: 3.59 THOUSANDS/ΜL (ref 1.85–7.62)
NEUTS SEG NFR BLD AUTO: 58 % (ref 43–75)
NONHDLC SERPL-MCNC: 134 MG/DL
NRBC BLD AUTO-RTO: 0 /100 WBCS
PLATELET # BLD AUTO: 324 THOUSANDS/UL (ref 149–390)
PMV BLD AUTO: 11.6 FL (ref 8.9–12.7)
POTASSIUM SERPL-SCNC: 4.1 MMOL/L (ref 3.5–5.3)
PROT SERPL-MCNC: 7.6 G/DL (ref 6.4–8.2)
RBC # BLD AUTO: 4.34 MILLION/UL (ref 3.81–5.12)
SODIUM SERPL-SCNC: 139 MMOL/L (ref 136–145)
TRIGL SERPL-MCNC: 48 MG/DL
TSH SERPL DL<=0.05 MIU/L-ACNC: 1.61 UIU/ML (ref 0.36–3.74)
WBC # BLD AUTO: 6.3 THOUSAND/UL (ref 4.31–10.16)

## 2019-12-18 PROCEDURE — 84443 ASSAY THYROID STIM HORMONE: CPT | Performed by: NURSE PRACTITIONER

## 2019-12-18 PROCEDURE — 80061 LIPID PANEL: CPT | Performed by: NURSE PRACTITIONER

## 2019-12-18 PROCEDURE — 85025 COMPLETE CBC W/AUTO DIFF WBC: CPT | Performed by: NURSE PRACTITIONER

## 2019-12-18 PROCEDURE — 99395 PREV VISIT EST AGE 18-39: CPT | Performed by: NURSE PRACTITIONER

## 2019-12-18 PROCEDURE — 90651 9VHPV VACCINE 2/3 DOSE IM: CPT

## 2019-12-18 PROCEDURE — 87389 HIV-1 AG W/HIV-1&-2 AB AG IA: CPT | Performed by: NURSE PRACTITIONER

## 2019-12-18 PROCEDURE — 90471 IMMUNIZATION ADMIN: CPT

## 2019-12-18 PROCEDURE — 36415 COLL VENOUS BLD VENIPUNCTURE: CPT | Performed by: NURSE PRACTITIONER

## 2019-12-18 PROCEDURE — 80053 COMPREHEN METABOLIC PANEL: CPT | Performed by: NURSE PRACTITIONER

## 2019-12-18 SDOH — SOCIAL STABILITY - SOCIAL INSECURITY: DISSAPEARANCE AND DEATH OF FAMILY MEMBER: Z63.4

## 2019-12-18 NOTE — PATIENT INSTRUCTIONS

## 2019-12-18 NOTE — PROGRESS NOTES
[address]    NAME: Irvin Guzman  AGE: 34 y o  SEX: female  : 1990     DATE: 2019     Assessment and Plan:     Problem List Items Addressed This Visit        Genitourinary    Female infertility    Relevant Orders    Ambulatory referral to Obstetrics / Gynecology      Other Visit Diagnoses     Annual physical exam    -  Primary    Relevant Orders    CBC and differential    Lipid panel    Comprehensive metabolic panel    Primary insomnia        Relevant Orders    TSH, 3rd generation with Free T4 reflex    Need for vaccination        Relevant Orders    HPV VACCINE 9 VALENT IM    Screening for HIV (human immunodeficiency virus)        Relevant Orders    HIV 1/2 AG-AB combo    Death of family member     Advised counseling            Immunizations and preventive care screenings were discussed with patient today  Appropriate education was printed on patient's after visit summary  Counseling:  Dental Health: discussed importance of regular tooth brushing, flossing, and dental visits  Sexual health: discussed sexually transmitted diseases, partner selection, use of condoms, avoidance of unintended pregnancy, and contraceptive alternatives  · Exercise: the importance of regular exercise/physical activity was discussed  Recommend exercise 3-5 times per week for at least 30 minutes  Return in about 1 year (around 2020) for Annual physical      Chief Complaint:     Chief Complaint   Patient presents with    Annual Exam      History of Present Illness:     Adult Annual Physical   Patient here for a comprehensive physical exam  The patient reports problems - having issues with sleeping   Having trouble falling asleep  also waking up   Nightly       Diet and Physical Activity  · Diet/Nutrition: well balanced diet and consuming 3-5 servings of fruits/vegetables daily  · Exercise: no formal exercise  Depression Screening  PHQ-9 Depression Screening    PHQ-9:    Frequency of the following problems over the past two weeks:       Little interest or pleasure in doing things:  0 - not at all  Feeling down, depressed, or hopeless:  0 - not at all  PHQ-2 Score:  0       General Health  · Sleep: sleeps poorly  · Hearing: normal - bilateral   · Vision: no vision problems  · Dental: regular dental visits  /GYN Health  · Last menstrual period: today  · Contraceptive method: none  · History of STDs?: yes  treated     Review of Systems:     Review of Systems   Constitutional: Negative for activity change, appetite change, fatigue, fever and unexpected weight change  HENT: Negative for congestion, dental problem and sneezing  Eyes: Negative for discharge and visual disturbance  Respiratory: Negative for cough and wheezing  Cardiovascular: Negative for chest pain  Gastrointestinal: Negative for abdominal pain, constipation, diarrhea, nausea and vomiting  Endocrine: Negative for polydipsia and polyuria  Genitourinary: Negative for dysuria and frequency  Musculoskeletal: Negative for arthralgias  Skin: Negative for rash  Allergic/Immunologic: Negative for environmental allergies and food allergies  Neurological: Negative for dizziness, numbness and headaches  Hematological: Negative for adenopathy  Psychiatric/Behavioral: Positive for sleep disturbance  Negative for behavioral problems  The patient is not nervous/anxious         Past Medical History:     Past Medical History:   Diagnosis Date    Fallopian tube disorder     Fibroadenoma of left breast 08/28/2008    Known health problems: none     Ovarian cyst       Past Surgical History:     Past Surgical History:   Procedure Laterality Date    BREAST SURGERY Left 08/28/2008    lesion excision      Social History:     Social History     Socioeconomic History    Marital status: Single     Spouse name: None    Number of children: None    Years of education: None    Highest education level: None   Occupational History    None   Social Needs    Financial resource strain: None    Food insecurity:     Worry: None     Inability: None    Transportation needs:     Medical: None     Non-medical: None   Tobacco Use    Smoking status: Never Smoker    Smokeless tobacco: Never Used   Substance and Sexual Activity    Alcohol use: Yes     Comment: occ      Drug use: No    Sexual activity: Yes     Partners: Male     Birth control/protection: Condom Male   Lifestyle    Physical activity:     Days per week: None     Minutes per session: None    Stress: None   Relationships    Social connections:     Talks on phone: None     Gets together: None     Attends Yarsani service: None     Active member of club or organization: None     Attends meetings of clubs or organizations: None     Relationship status: None    Intimate partner violence:     Fear of current or ex partner: None     Emotionally abused: None     Physically abused: None     Forced sexual activity: None   Other Topics Concern    None   Social History Narrative    None      Family History:     Family History   Problem Relation Age of Onset    No Known Problems Mother     No Known Problems Father       Current Medications:     Current Outpatient Medications   Medication Sig Dispense Refill    albuterol (PROVENTIL HFA,VENTOLIN HFA) 90 mcg/act inhaler Inhale 2 puffs every 4 (four) hours as needed for wheezing (or cough) (Patient not taking: Reported on 5/13/2019) 1 Inhaler 0    cetirizine (ZyrTEC) 10 mg tablet Take 1 tablet by mouth daily 30 tablet 0    cyclobenzaprine (FLEXERIL) 10 mg tablet Take 1 tablet (10 mg total) by mouth 2 (two) times a day as needed for muscle spasms (Patient not taking: Reported on 12/18/2019) 15 tablet 0    diclofenac potassium (CATAFLAM) 50 mg tablet Take 1 tablet by mouth 3 (three) times a day for 7 days 21 tablet 0    fluticasone (FLONASE) 50 mcg/act nasal spray 1 spray into each nostril daily 16 g 0    ibuprofen (MOTRIN) 400 mg tablet Take 1 tablet (400 mg total) by mouth every 8 (eight) hours as needed for moderate pain (Patient not taking: Reported on 12/18/2019) 20 tablet 0    Multiple Vitamins-Minerals (MULTIVITAMIN ADULT PO) Take by mouth       No current facility-administered medications for this visit  Allergies:     No Known Allergies   Physical Exam:     /76 (BP Location: Left arm, Patient Position: Sitting, Cuff Size: Adult)   Pulse 90   Temp 98 8 °F (37 1 °C) (Tympanic)   Resp 16   Ht 5' 1" (1 549 m)   Wt 59 3 kg (130 lb 12 8 oz)   SpO2 96%   BMI 24 71 kg/m²     Physical Exam   Constitutional: She is oriented to person, place, and time  She appears well-developed and well-nourished  HENT:   Head: Normocephalic  Right Ear: External ear normal    Left Ear: External ear normal    Nose: Nose normal    Eyes: Pupils are equal, round, and reactive to light  Conjunctivae are normal  Right eye exhibits no discharge  Left eye exhibits no discharge  Neck: Normal range of motion  Neck supple  No thyromegaly present  Cardiovascular: Normal rate, regular rhythm and normal heart sounds  No murmur heard  Pulmonary/Chest: Effort normal and breath sounds normal    Abdominal: Soft  Bowel sounds are normal  There is no tenderness  Musculoskeletal: Normal range of motion  Lymphadenopathy:     She has no cervical adenopathy  Neurological: She is alert and oriented to person, place, and time  Skin: Skin is warm  No rash noted  Psychiatric: She has a normal mood and affect  Her behavior is normal    Vitals reviewed        [address]

## 2019-12-19 LAB — HIV 1+2 AB+HIV1 P24 AG SERPL QL IA: NORMAL

## 2020-03-06 ENCOUNTER — ANNUAL EXAM (OUTPATIENT)
Dept: FAMILY MEDICINE CLINIC | Facility: CLINIC | Age: 30
End: 2020-03-06
Payer: COMMERCIAL

## 2020-03-06 VITALS
RESPIRATION RATE: 18 BRPM | BODY MASS INDEX: 25.39 KG/M2 | SYSTOLIC BLOOD PRESSURE: 110 MMHG | DIASTOLIC BLOOD PRESSURE: 70 MMHG | HEIGHT: 63 IN | WEIGHT: 143.3 LBS | HEART RATE: 80 BPM

## 2020-03-06 DIAGNOSIS — N76.0 ACUTE VAGINITIS: ICD-10-CM

## 2020-03-06 DIAGNOSIS — Z01.411 ENCNTR FOR GYN EXAM (GENERAL) (ROUTINE) W ABNORMAL FINDINGS: Primary | ICD-10-CM

## 2020-03-06 DIAGNOSIS — Z20.2 POSSIBLE EXPOSURE TO STD: ICD-10-CM

## 2020-03-06 DIAGNOSIS — Z12.4 SCREENING FOR CERVICAL CANCER: ICD-10-CM

## 2020-03-06 PROCEDURE — 87591 N.GONORRHOEAE DNA AMP PROB: CPT | Performed by: NURSE PRACTITIONER

## 2020-03-06 PROCEDURE — 87660 TRICHOMONAS VAGIN DIR PROBE: CPT | Performed by: NURSE PRACTITIONER

## 2020-03-06 PROCEDURE — 87480 CANDIDA DNA DIR PROBE: CPT | Performed by: NURSE PRACTITIONER

## 2020-03-06 PROCEDURE — G0145 SCR C/V CYTO,THINLAYER,RESCR: HCPCS | Performed by: NURSE PRACTITIONER

## 2020-03-06 PROCEDURE — 87510 GARDNER VAG DNA DIR PROBE: CPT | Performed by: NURSE PRACTITIONER

## 2020-03-06 PROCEDURE — 1036F TOBACCO NON-USER: CPT | Performed by: NURSE PRACTITIONER

## 2020-03-06 PROCEDURE — 87491 CHLMYD TRACH DNA AMP PROBE: CPT | Performed by: NURSE PRACTITIONER

## 2020-03-06 PROCEDURE — 3008F BODY MASS INDEX DOCD: CPT | Performed by: NURSE PRACTITIONER

## 2020-03-06 PROCEDURE — 99214 OFFICE O/P EST MOD 30 MIN: CPT | Performed by: NURSE PRACTITIONER

## 2020-03-06 NOTE — PROGRESS NOTES
Subjective     Nikko Avila is a 34 y o   female and is here for routine health maintenance  The patient reports no problems  History of Present Illness     HPI  Here for GYN visit  And pap   No concerns   On birthcontrol   One partner  Well Adult Physical   Patient here for a Gynecological exam       Diet and Physical Activity  Diet: well balanced diet  Weight concerns: Patient is overweight (BMI 25 0-29  9)  Exercise: frequently      Depression Screen  PHQ-9 Depression Screening    PHQ-9:    Frequency of the following problems over the past two weeks:       Little interest or pleasure in doing things:  0 - not at all  Feeling down, depressed, or hopeless:  0 - not at all  PHQ-2 Score:  0           History:  LMP: 2/15  Menses regular  yes  Cycle Length5-6 days  Flow normal  Menorrhagia  no  Dysmenorrhea   no       Screening      Pap today  Abnormal pap? no      The following portions of the patient's history were reviewed and updated as appropriate: allergies, current medications, past family history, past medical history, past social history, past surgical history and problem list     Review of Systems     Review of Systems   Constitutional: Negative for activity change, appetite change, fatigue, fever and unexpected weight change  HENT: Negative for congestion, dental problem, postnasal drip and sneezing  Eyes: Negative for discharge and visual disturbance  Respiratory: Negative for cough and wheezing  Cardiovascular: Negative for chest pain  Gastrointestinal: Negative for abdominal pain, constipation, diarrhea, nausea and vomiting  Endocrine: Negative for polydipsia and polyuria  Genitourinary: Negative for dysuria, frequency and urgency  Musculoskeletal: Negative for arthralgias  Skin: Negative for rash  Allergic/Immunologic: Negative for environmental allergies and food allergies  Neurological: Negative for headaches  Hematological: Negative for adenopathy  Psychiatric/Behavioral: Negative for behavioral problems and sleep disturbance  Breast ROS: negative for breast lumps  Self Breast Exam yes  Genito-Urinary ROS: no dysuria, trouble voiding, or hematuria    Insomnia no  Sexually Active yes  Birth Control Method none  Calcium no  Vitamin D multivit  Weight bearing Exercise yes  Past Medical History     Past Medical History:   Diagnosis Date    Fallopian tube disorder     Fibroadenoma of left breast 08/28/2008    Known health problems: none     Ovarian cyst        Past Surgical History     Past Surgical History:   Procedure Laterality Date    BREAST SURGERY Left 08/28/2008    lesion excision       Social History     Social History     Socioeconomic History    Marital status: Single     Spouse name: None    Number of children: None    Years of education: None    Highest education level: None   Occupational History    None   Social Needs    Financial resource strain: None    Food insecurity:     Worry: None     Inability: None    Transportation needs:     Medical: None     Non-medical: None   Tobacco Use    Smoking status: Never Smoker    Smokeless tobacco: Never Used   Substance and Sexual Activity    Alcohol use: Yes     Comment: occ      Drug use: No    Sexual activity: Yes     Partners: Male     Birth control/protection: Condom Male   Lifestyle    Physical activity:     Days per week: None     Minutes per session: None    Stress: None   Relationships    Social connections:     Talks on phone: None     Gets together: None     Attends [employer] service: None     Active member of club or organization: None     Attends meetings of clubs or organizations: None     Relationship status: None    Intimate partner violence:     Fear of current or ex partner: None     Emotionally abused: None     Physically abused: None     Forced sexual activity: None   Other Topics Concern    None   Social History Narrative    None       Family History Family History   Problem Relation Age of Onset    No Known Problems Mother     No Known Problems Father        Current Medications       Current Outpatient Medications:     Multiple Vitamins-Minerals (MULTIVITAMIN ADULT PO), Take by mouth, Disp: , Rfl:     cetirizine (ZyrTEC) 10 mg tablet, Take 1 tablet by mouth daily, Disp: 30 tablet, Rfl: 0    diclofenac potassium (CATAFLAM) 50 mg tablet, Take 1 tablet by mouth 3 (three) times a day for 7 days, Disp: 21 tablet, Rfl: 0    fluticasone (FLONASE) 50 mcg/act nasal spray, 1 spray into each nostril daily, Disp: 16 g, Rfl: 0     Allergies     No Known Allergies    Objective     /70 (BP Location: Left arm, Patient Position: Sitting, Cuff Size: Large)   Pulse 80   Resp 18   Ht 5' 3" (1 6 m)   Wt 65 kg (143 lb 4 8 oz)   BMI 25 38 kg/m²      Physical Exam   Constitutional: She appears well-developed and well-nourished  HENT:   Right Ear: External ear normal    Left Ear: External ear normal    Nose: Nose normal    Mouth/Throat: Oropharynx is clear and moist    Eyes: Conjunctivae are normal    Neck: Neck supple  No thyromegaly present  Cardiovascular: Normal rate, regular rhythm and normal heart sounds  Pulmonary/Chest: Effort normal and breath sounds normal    Abdominal: Soft  Bowel sounds are normal    Genitourinary: Uterus normal  Rectal exam shows no external hemorrhoid  Cervix exhibits no motion tenderness and no friability  Right adnexum displays no tenderness and no fullness  Left adnexum displays no tenderness and no fullness  No erythema or tenderness in the vagina  Vaginal discharge found  Lymphadenopathy:     She has no cervical adenopathy  Vitals reviewed          No exam data present    Health Maintenance     Health Maintenance   Topic Date Due    BMI: Followup Plan  11/27/2010    Cervical Cancer Screening  11/27/2013    Influenza Vaccine  07/01/2019    Depression Screening PHQ  12/18/2020    Annual Physical  12/18/2020    BMI: Adult  03/06/2021    DTaP,Tdap,and Td Vaccines (8 - Td) 09/25/2026    Pneumococcal Vaccine: 65+ Years (1 of 2 - PCV13) 11/27/2057    HIV Screening  Completed    HIB Vaccine  Completed    Hepatitis B Vaccine  Completed    IPV Vaccine  Completed    HPV Vaccine  Completed    Pneumococcal Vaccine: Pediatrics (0 to 5 Years) and At-Risk Patients (6 to 59 Years)  Aged Out    Hepatitis A Vaccine  Aged Out    Meningococcal ACWY Vaccine  Aged [employer] History   Administered Date(s) Administered    DTP 01/27/1993, 03/03/1993, 06/18/1993, 07/11/1994    DTaP 12/27/1996    HPV Quadrivalent 05/21/2009, 07/22/2009    HPV9 12/18/2019    Hep B, Adolescent or Pediatric 07/07/1995, 08/07/1995, 01/18/1996    Hib (HbOC) 01/27/1993, 03/30/1993, 06/15/1993, 03/25/1994    INFLUENZA 11/15/2017, 11/15/2017, 10/12/2019    IPV 01/27/1993, 03/30/1993, 07/11/1994, 12/27/1996    MMR 03/25/1994, 12/27/1996    Tdap 05/01/2015, 09/25/2016    Tuberculin Skin Test-PPD Intradermal 03/20/2015, 04/06/2015    Varicella 04/27/1998       Assessment/Plan     Healthy well female  Waiting on PAP/HPV results  BMI Counseling: Body mass index is 25 38 kg/m²  The BMI is above normal  Nutrition recommendations include decreasing portion sizes, encouraging healthy choices of fruits and vegetables, decreasing fast food intake, consuming healthier snacks and moderation in carbohydrate intake  Exercise recommendations include exercising 3-5 times per week  No pharmacotherapy was ordered           Cecil Lewis

## 2020-03-07 ENCOUNTER — DOCUMENTATION (OUTPATIENT)
Dept: FAMILY MEDICINE CLINIC | Facility: CLINIC | Age: 30
End: 2020-03-07

## 2020-03-07 DIAGNOSIS — B96.89 BACTERIAL VAGINOSIS: Primary | ICD-10-CM

## 2020-03-07 DIAGNOSIS — N76.0 BACTERIAL VAGINOSIS: Primary | ICD-10-CM

## 2020-03-07 LAB
CANDIDA RRNA VAG QL PROBE: NEGATIVE
G VAGINALIS RRNA GENITAL QL PROBE: POSITIVE
T VAGINALIS RRNA GENITAL QL PROBE: NEGATIVE

## 2020-03-07 RX ORDER — METRONIDAZOLE 7.5 MG/G
1 GEL VAGINAL
Qty: 5 G | Refills: 0 | Status: SHIPPED | OUTPATIENT
Start: 2020-03-07 | End: 2020-03-10

## 2020-03-09 LAB
C TRACH DNA SPEC QL NAA+PROBE: NEGATIVE
N GONORRHOEA DNA SPEC QL NAA+PROBE: NEGATIVE

## 2020-03-10 DIAGNOSIS — N76.0 BV (BACTERIAL VAGINOSIS): Primary | ICD-10-CM

## 2020-03-10 DIAGNOSIS — B96.89 BV (BACTERIAL VAGINOSIS): Primary | ICD-10-CM

## 2020-03-10 RX ORDER — METRONIDAZOLE 7.5 MG/G
1 GEL VAGINAL
Qty: 25 G | Refills: 0 | Status: SHIPPED | OUTPATIENT
Start: 2020-03-10 | End: 2020-03-15

## 2020-03-11 ENCOUNTER — TELEPHONE (OUTPATIENT)
Dept: FAMILY MEDICINE CLINIC | Facility: CLINIC | Age: 30
End: 2020-03-11

## 2020-03-11 LAB
LAB AP GYN PRIMARY INTERPRETATION: NORMAL
LAB AP LMP: NORMAL
Lab: NORMAL
PATH INTERP SPEC-IMP: NORMAL

## 2020-03-11 NOTE — TELEPHONE ENCOUNTER
Ordering User:  XU Walker            Diagnosis Association: BV (bacterial vaginosis) (N76 0 , B96 89)      Pharmacy:  [facility]/pharmacy #[id number] [address], [address] Phone:  [phone] Fax:  [phone]    Address:  [address], [address] [address] [place] #:  [id number]     Pharmacy Comments:  --          Fill quantity remaining:  -- Fill quantity used:  -- Next fill due: --         Called [facility] to confirm the Zondra Birmingham sent in was received, we had confirmation Rx was received by pharmacy  Pharmacist said they did not have record of it, provided Verbal from the Order [facility] submitted

## 2020-04-22 ENCOUNTER — DOCUMENTATION (OUTPATIENT)
Dept: FAMILY MEDICINE CLINIC | Facility: CLINIC | Age: 30
End: 2020-04-22

## 2021-04-23 ENCOUNTER — OFFICE VISIT (OUTPATIENT)
Dept: OBGYN CLINIC | Facility: CLINIC | Age: 31
End: 2021-04-23
Payer: COMMERCIAL

## 2021-04-23 VITALS
BODY MASS INDEX: 27.38 KG/M2 | DIASTOLIC BLOOD PRESSURE: 64 MMHG | HEIGHT: 61 IN | SYSTOLIC BLOOD PRESSURE: 102 MMHG | WEIGHT: 145 LBS

## 2021-04-23 DIAGNOSIS — Z31.41 FERTILITY TESTING: Primary | ICD-10-CM

## 2021-04-23 PROBLEM — J30.2 SEASONAL ALLERGIES: Status: ACTIVE | Noted: 2021-04-23

## 2021-04-23 PROCEDURE — 3008F BODY MASS INDEX DOCD: CPT | Performed by: OBSTETRICS & GYNECOLOGY

## 2021-04-23 PROCEDURE — 99214 OFFICE O/P EST MOD 30 MIN: CPT | Performed by: OBSTETRICS & GYNECOLOGY

## 2021-04-23 PROCEDURE — 1036F TOBACCO NON-USER: CPT | Performed by: OBSTETRICS & GYNECOLOGY

## 2021-04-23 NOTE — PATIENT INSTRUCTIONS
1  Make sure you are taking 931 mcg of folic acid daily  2  Begin ovulation detection kits on day 10 of your cycle up until day 20  3   Have intercourse EVERY OTHER DAY from day 10-20 of your menstrual cycle

## 2021-04-27 ENCOUNTER — APPOINTMENT (OUTPATIENT)
Dept: LAB | Facility: HOSPITAL | Age: 31
End: 2021-04-27
Attending: OBSTETRICS & GYNECOLOGY
Payer: COMMERCIAL

## 2021-04-27 DIAGNOSIS — Z31.41 FERTILITY TESTING: ICD-10-CM

## 2021-04-27 LAB
ESTRADIOL SERPL-MCNC: 50 PG/ML
FSH SERPL-ACNC: 6.5 MIU/ML
LH SERPL-ACNC: 10.7 MIU/ML
PROGEST SERPL-MCNC: 1.2 NG/ML
PROLACTIN SERPL-MCNC: 20.3 NG/ML
TSH SERPL DL<=0.05 MIU/L-ACNC: 1.11 UIU/ML (ref 0.36–3.74)

## 2021-04-27 PROCEDURE — 84146 ASSAY OF PROLACTIN: CPT

## 2021-04-27 PROCEDURE — 84443 ASSAY THYROID STIM HORMONE: CPT

## 2021-04-27 PROCEDURE — 83001 ASSAY OF GONADOTROPIN (FSH): CPT

## 2021-04-27 PROCEDURE — 83002 ASSAY OF GONADOTROPIN (LH): CPT

## 2021-04-27 PROCEDURE — 84144 ASSAY OF PROGESTERONE: CPT

## 2021-04-27 PROCEDURE — 82670 ASSAY OF TOTAL ESTRADIOL: CPT

## 2021-04-27 PROCEDURE — 36415 COLL VENOUS BLD VENIPUNCTURE: CPT

## 2021-04-30 ENCOUNTER — HOSPITAL ENCOUNTER (OUTPATIENT)
Dept: RADIOLOGY | Facility: HOSPITAL | Age: 31
Discharge: HOME | End: 2021-04-30
Attending: OBSTETRICS & GYNECOLOGY | Admitting: RADIOLOGY
Payer: COMMERCIAL

## 2021-04-30 DIAGNOSIS — Z31.41 FERTILITY TESTING: ICD-10-CM

## 2021-04-30 PROCEDURE — 58340 CATHETER FOR HYSTEROGRAPHY: CPT

## 2021-04-30 PROCEDURE — 74740 X-RAY FEMALE GENITAL TRACT: CPT

## 2021-04-30 RX ADMIN — IOHEXOL 4 ML: 240 INJECTION, SOLUTION INTRATHECAL; INTRAVASCULAR; INTRAVENOUS; ORAL at 13:40

## 2021-04-30 NOTE — PROCEDURES
Nghia Jhaveri, a  at Unknown with an KAREEM of Not found  , was seen at [place] [address] for the following procedure(s):  ]    The patient presents for hysterosalpingogram for fertility testing    Verbal consent for the procedure was obtained  All questions were answered  A time out was performed verifying the patient's name and date of birth  The patient was placed in dorsal lithotomy position  A sterile speculum was placed in the vagina  The cervix was cleansed with betadine x 3 swabs  A tenaculum was used to grasp the anterior lip of the cervix  The hysterosalpingogram catheter was placed through the cervical os and the balloon was dilated  The speculum and the tenaculum were then removed  Under direct fluoroscopy, 4 ml of Omnipaque 240 was used to visualized the uterus and fallopian tubes  The uterus was noted to be retro flexed, had a normal shape and contour  Bilateral fallopian tubes were noted to be patent  The procedure was completed and all instruments were removed  The patient tolerated the procedure well  Impression: Normal hysterosalpingogram with bilateral fallopian tube spill

## 2021-05-14 ENCOUNTER — TRANSCRIBE ORDERS (OUTPATIENT)
Dept: ADMINISTRATIVE | Facility: HOSPITAL | Age: 31
End: 2021-05-14

## 2021-05-14 ENCOUNTER — APPOINTMENT (OUTPATIENT)
Dept: LAB | Facility: HOSPITAL | Age: 31
End: 2021-05-14
Attending: OBSTETRICS & GYNECOLOGY
Payer: COMMERCIAL

## 2021-05-14 DIAGNOSIS — Z31.41 FERTILITY TESTING: ICD-10-CM

## 2021-05-14 DIAGNOSIS — Z31.41 FERTILITY TESTING: Primary | ICD-10-CM

## 2021-05-14 LAB — PROGEST SERPL-MCNC: 6.9 NG/ML

## 2021-05-14 PROCEDURE — 36415 COLL VENOUS BLD VENIPUNCTURE: CPT

## 2021-05-14 PROCEDURE — 84144 ASSAY OF PROGESTERONE: CPT

## 2021-10-05 ENCOUNTER — HOSPITAL ENCOUNTER (EMERGENCY)
Facility: HOSPITAL | Age: 31
Discharge: HOME | End: 2021-10-05
Attending: EMERGENCY MEDICINE
Payer: COMMERCIAL

## 2021-10-05 VITALS
RESPIRATION RATE: 16 BRPM | HEART RATE: 94 BPM | WEIGHT: 136.02 LBS | TEMPERATURE: 98 F | SYSTOLIC BLOOD PRESSURE: 113 MMHG | DIASTOLIC BLOOD PRESSURE: 56 MMHG | OXYGEN SATURATION: 97 % | BODY MASS INDEX: 25.68 KG/M2 | HEIGHT: 61 IN

## 2021-10-05 DIAGNOSIS — Z20.822 PERSON UNDER INVESTIGATION FOR COVID-19: Primary | ICD-10-CM

## 2021-10-05 DIAGNOSIS — R09.81 NASAL CONGESTION: ICD-10-CM

## 2021-10-05 DIAGNOSIS — J02.9 SORE THROAT: ICD-10-CM

## 2021-10-05 DIAGNOSIS — R51.9 HEADACHE: ICD-10-CM

## 2021-10-05 DIAGNOSIS — M79.10 MYALGIA: ICD-10-CM

## 2021-10-05 LAB — SARS-COV-2 RNA RESP QL NAA+PROBE: NEGATIVE

## 2021-10-05 PROCEDURE — 99283 EMERGENCY DEPT VISIT LOW MDM: CPT

## 2021-10-05 PROCEDURE — U0005 INFEC AGEN DETEC AMPLI PROBE: HCPCS | Performed by: EMERGENCY MEDICINE

## 2021-10-05 PROCEDURE — 99284 EMERGENCY DEPT VISIT MOD MDM: CPT | Performed by: EMERGENCY MEDICINE

## 2021-10-05 PROCEDURE — U0003 INFECTIOUS AGENT DETECTION BY NUCLEIC ACID (DNA OR RNA); SEVERE ACUTE RESPIRATORY SYNDROME CORONAVIRUS 2 (SARS-COV-2) (CORONAVIRUS DISEASE [COVID-19]), AMPLIFIED PROBE TECHNIQUE, MAKING USE OF HIGH THROUGHPUT TECHNOLOGIES AS DESCRIBED BY CMS-2020-01-R: HCPCS | Performed by: EMERGENCY MEDICINE

## 2022-01-28 ENCOUNTER — TELEPHONE (OUTPATIENT)
Dept: FAMILY MEDICINE CLINIC | Facility: CLINIC | Age: 32
End: 2022-01-28

## 2023-01-27 ENCOUNTER — HOSPITAL ENCOUNTER (EMERGENCY)
Facility: HOSPITAL | Age: 33
Discharge: HOME | End: 2023-01-27
Attending: EMERGENCY MEDICINE

## 2023-01-27 VITALS
RESPIRATION RATE: 18 BRPM | DIASTOLIC BLOOD PRESSURE: 72 MMHG | TEMPERATURE: 98.5 F | SYSTOLIC BLOOD PRESSURE: 113 MMHG | HEART RATE: 93 BPM | OXYGEN SATURATION: 98 %

## 2023-01-27 DIAGNOSIS — L03.213 PERIORBITAL CELLULITIS: Primary | ICD-10-CM

## 2023-01-27 RX ORDER — LIDOCAINE HYDROCHLORIDE 10 MG/ML
5 INJECTION, SOLUTION EPIDURAL; INFILTRATION; INTRACAUDAL; PERINEURAL ONCE
Status: COMPLETED | OUTPATIENT
Start: 2023-01-27 | End: 2023-01-27

## 2023-01-27 RX ORDER — CLINDAMYCIN HYDROCHLORIDE 150 MG/1
300 CAPSULE ORAL ONCE
Status: COMPLETED | OUTPATIENT
Start: 2023-01-27 | End: 2023-01-27

## 2023-01-27 RX ORDER — CLINDAMYCIN HYDROCHLORIDE 150 MG/1
300 CAPSULE ORAL EVERY 8 HOURS SCHEDULED
Qty: 42 CAPSULE | Refills: 0 | Status: SHIPPED | OUTPATIENT
Start: 2023-01-27 | End: 2023-02-03

## 2023-01-27 RX ADMIN — LIDOCAINE HYDROCHLORIDE 5 ML: 10 INJECTION, SOLUTION EPIDURAL; INFILTRATION; INTRACAUDAL; PERINEURAL at 09:52

## 2023-01-27 RX ADMIN — CLINDAMYCIN HYDROCHLORIDE 300 MG: 150 CAPSULE ORAL at 09:52

## 2023-01-27 NOTE — ED ATTENDING ATTESTATION
Flora Casanova MD, saw and evaluated the patient  I have discussed the patient with the resident and agree with the resident's findings, Plan of Care, and MDM as documented in the resident's note, except where noted  All available labs and Radiology studies were reviewed  I was present for key portions of any procedure(s) performed by the resident and I was immediately available to provide assistance  At this point I agree with the current assessment done in the Emergency Department  I have conducted an independent evaluation of this patient a history and physical is as follows:    30 yo female presents to the ED complaining of redness, pain, and swelling to right right upper eyelid x several days  Today when she woke up she noticed a "agee" on the lid  The patient has a known chalazion in the area and is scheduled for removal in March but the redness and pain are new complaints  No eye involvement  No pain with extraocular movements  No visual disturbance  No eye pain or redness  She denies fevers/chills  No other specific complaints  ROS: per resident physician note    Gen: NAD, AA&Ox3  HEENT: PERRL, EOMI, (+) erythema/warmth/swelling to right upper eyelid, (+) small abscess clearly visible, no conjunctival injection  Neck: supple  CV: RRR  Lungs: CTA B/L  Abdomen: soft, NT/ND  Ext: no swelling or deformity  Neuro: 5/5 strength all extremities, sensation grossly intact  Skin: no rash    ED Course  The patient is comfortable appearing with stable vital signs  Exam is consistent with a preseptal cellulitis + small abscess  Plan for needle aspiration at bedside, pain control, warm compresses, and a course of oral antibiotics  The patient was instructed to follow up with her PCP and ophthalmologist next week for reassessment  She is agreeable to this plan  Strict return precautions provided        Critical Care Time  Procedures

## 2023-01-27 NOTE — DISCHARGE INSTRUCTIONS
You were seen in the emergency department for swelling over your eye that is preseptal cellulitis with an abscess  I drained it in the emergency department however you will need to take antibiotics for the next 7 days  Please  your antibiotic from the pharmacy today and begin taking this evening and again before bed  Did receive your first dose here in the emergency department  If you notice significantly worse symptoms (pain with eye movement, blurring of your vision, much worse swelling, or fevers) then you need to come back to the emergency department for evaluation  Otherwise please follow-up with [facility]

## 2023-04-20 ENCOUNTER — APPOINTMENT (OUTPATIENT)
Dept: LAB | Facility: CLINIC | Age: 33
End: 2023-04-20

## 2023-04-20 DIAGNOSIS — Z11.3 SCREENING EXAMINATION FOR STD (SEXUALLY TRANSMITTED DISEASE): ICD-10-CM

## 2023-04-20 LAB
EXTERNAL CHLAMYDIA SCREEN: NEGATIVE
EXTERNAL GONORRHEA SCREEN: NEGATIVE
HBV CORE IGM SER QL: NORMAL
HIV 1+2 AB+HIV1 P24 AG SERPL QL IA: NORMAL
HIV 2 AB SERPL QL IA: NORMAL
HIV1 AB SERPL QL IA: NORMAL
HIV1 P24 AG SERPL QL IA: NORMAL
TREPONEMA PALLIDUM IGG+IGM AB [PRESENCE] IN SERUM OR PLASMA BY IMMUNOASSAY: NORMAL

## 2023-07-05 ENCOUNTER — APPOINTMENT (OUTPATIENT)
Dept: LAB | Facility: CLINIC | Age: 33
End: 2023-07-05
Payer: COMMERCIAL

## 2023-07-05 DIAGNOSIS — N92.6 MISSED PERIOD: ICD-10-CM

## 2023-07-05 DIAGNOSIS — N92.6 MISSED PERIOD: Primary | ICD-10-CM

## 2023-07-05 LAB — HCG SERPL QL: POSITIVE

## 2023-07-05 PROCEDURE — 84703 CHORIONIC GONADOTROPIN ASSAY: CPT

## 2023-07-05 PROCEDURE — 36415 COLL VENOUS BLD VENIPUNCTURE: CPT

## 2023-07-25 ENCOUNTER — ULTRASOUND (OUTPATIENT)
Dept: OBGYN CLINIC | Facility: CLINIC | Age: 33
End: 2023-07-25
Payer: COMMERCIAL

## 2023-07-25 DIAGNOSIS — O36.80X1 ENCOUNTER TO DETERMINE FETAL VIABILITY OF PREGNANCY, FETUS 1: Primary | ICD-10-CM

## 2023-07-25 PROCEDURE — 76801 OB US < 14 WKS SINGLE FETUS: CPT | Performed by: OBSTETRICS & GYNECOLOGY

## 2023-07-25 NOTE — PROGRESS NOTES
SV BFD=803 BPM  CRL=8.3 mm=6w5d  EDC=3/14/2024    UT=81 x 43 x 57 mm  RT OV=35 x 35 x 31 mm, with CL Cyst=27 x 26 x 23 mm  LT OV=23 x 21 x 15 mm

## 2023-07-26 ENCOUNTER — INITIAL PRENATAL (OUTPATIENT)
Dept: OBGYN CLINIC | Facility: CLINIC | Age: 33
End: 2023-07-26

## 2023-07-26 VITALS — HEIGHT: 62 IN | WEIGHT: 164 LBS | BODY MASS INDEX: 30.18 KG/M2

## 2023-07-26 DIAGNOSIS — Z34.01 ENCOUNTER FOR SUPERVISION OF NORMAL FIRST PREGNANCY IN FIRST TRIMESTER: Primary | ICD-10-CM

## 2023-07-26 PROCEDURE — OBC

## 2023-07-26 NOTE — PROGRESS NOTES
The patient was identified by name and date of birth and was informed that this is a virtual visit being conducted through a secure, HIPAA-complaint platform. I am in a private office space with the door closed. Patient acknowledged understanding of privacy. She agrees to proceed and is aware that she may discontinue the visit at any time. OB INTAKE INTERVIEW    OB History    Para Term  AB Living   1 0 0 0 0 0   SAB IAB Ectopic Multiple Live Births   0 0 0 0 0      # Outcome Date GA Lbr Rolly/2nd Weight Sex Delivery Anes PTL Lv   1 Current               Obstetric Comments   Menarche: 17      Menses: /regular pad every 3 hours        has a past medical history of Anal fissure (2015), Chlamydia, Fallopian tube disorder, Fibroadenoma of left breast (2008), Melena (2012), Ovarian cyst, and Varicella. has a past surgical history that includes Breast surgery (Left, 2008) and Springer tooth extraction. has No Known Allergies. Current Outpatient Medications:   •  Prenatal Vit-Fe Fumarate-FA (PRENATAL VITAMINS PO), Take by mouth, Disp: , Rfl:   •  cetirizine (ZyrTEC) 10 mg tablet, Take 1 tablet by mouth daily (Patient not taking: Reported on 2023), Disp: 30 tablet, Rfl: 0  •  Multiple Vitamins-Minerals (MULTIVITAMIN ADULT PO), Take by mouth (Patient not taking: Reported on 2023), Disp: , Rfl:     family history includes Asthma in her paternal grandmother; Breast cancer in her other; Breast cancer (age of onset: 28) in her maternal aunt; Diabetes in her maternal grandfather; Hypertension in her maternal grandfather, paternal grandfather, and paternal grandmother; No Known Problems in her brother, brother, maternal grandmother, and mother; Other in her father. Last Menstrual Period: Patient's last menstrual period was 2023 (exact date).     Estimated Date of Delivery: TBD-3/10/2024 per LMP    Daisy Bello is a 29 y.o. female  1 para 0 pregnant female who presents for her obstetrical intake. This pregnancy was not planned. Pt did not think she could get pregnant. Father of the baby is involved. Pregnancy symptoms: breast tenderness, fatigue, nausea and frequent urination          BMI: Body mass index is Body mass index is 30.49 kg/m². Discussed appropriate weight gain in pregnancy based on pre-gravid BMI. Diabetes  First degree relative with type 2 diabetes:no              Pregestational DM:  no              hx of GDM: no              hx of PCOS: no              prior hx of LGA/macrosomia:no           Hypertension   Age 28 or older:no   Obesity (BMI over 30):YES              Hx of chronic HTN: no              hx of gestational HTN:no              hx of preeclampsia, eclampsia, or HELLP syndrome: no              Family h/o preeclampsia:no    Autoimmune disease (systemic lupus erythematosus, antiphospholipid antibody syndrome):no   Nulliparity:YES              Multifetal gestation: no  More than 10 year pregnancy interval:no  Previous IUGR, low birthweight or small for gestational age:no     Infection Screening              Does the pt have a hx of MRSA? no              Recent travel outside of [place]? no     Thyroid- if yes order TSH with reflex T4  History of thyroid disease no    Bleeding Disorder or Hx of DVT-patient or first degree relative with history of. Order the following if not done previously.    (Factor V, antithrombin III, prothrombin gene mutation, protein C and S Ag, lupus anticoagulant, anticardiolipin, beta-2 glycoprotein)   no    OB/GYN-  History of abnormal pap smear no  History of HPV no  History of Herpes/HSV no  History of other STI (gonorrhea, chlamydia, trich) YES-CT    History of blood transfusion no  Ok for blood transfusion Yes     Substance screening  Social History     Tobacco Use   • Smoking status: Never   • Smokeless tobacco: Never   Vaping Use   • Vaping Use: Former   • Substances: Nicotine   Substance Use Topics   • Alcohol use: Not Currently     Alcohol/week: 1.0 - 2.0 standard drink of alcohol     Types: 1 - 2 Glasses of wine per week   • Drug use: Not Currently     Types: Marijuana     Comment: marijuana in high schoool       Genetic/MFM-  See prenatal genetic history screening in the prenatal episode for genetic history. Discussed carrier screening and consultation with MFM. Patient is interested. Referral to MFM given for first trimester screening and anatomy (level 2) scan. Cystic Fibrosis Screening offered. Patient was advised to check insurance coverage prior to testing. Patient is interested. Orders Placed This Encounter   Procedures   • Urine culture   • Hgb Fractionation Cascade   • Cystic fibrosis gene test   • HIV 1/2 AG/AB w Reflex [facility] for 2 yr old and above   • Hepatitis C antibody   • UA (URINE) with reflex to Scope   • Hepatitis B surface antigen   • CBC and differential   • Rubella antibody, IgG   • RPR-Syphilis Screening (Total Syphilis IGG/IGM)   • Hepatitis B surface antibody   • Anemia Panel w/Reflex, OB   • Ambulatory Referral to Maternal Fetal Medicine   • Type and screen     Prenatal lab work scripts given to patient for routine testing. Patient was advised to have initial prenatal blood work, that was ordered today, done within the next week. Verbalized understanding. Breast Feeding  Breastfeeding benefits discussed and patient does plan to breastfeed. Immunizations:  Discussed Flu, COVID, and Tdap, vaccinations. Interview education  Anticipated course of prenatal care including how and when to contact providers reviewed. HIV and other prenatal labs and testing discussed. Genetic testing discussed. Patient will check insurance coverage. Indications for ultrasonography reviewed. Use of any medications (including supplements, vitamins, herbs or OTC drugs) discussed. Reinforced daily use of prenatal vitamin.   Influenza, Covid, and Tdap vaccines counseled and recommendations reviewed. Proper dental care discussed. Weight gain counseling discussed including nutrition counseling; special diet, dietary precautions (mercury, listeriosis). Reviewed exercise recommendations and routine restrictions for activity including no lifting greater than 20 lbs   Environmental/work hazards reviewed including temperature guidelines and no prolonged stationary standing. Avoidance of saunas, hot tubs, and tanning beds reviewed. Toxoplasmosis precautions (cats/raw meat/unwashed vegetables) discussed. Tobacco/smoking, alcohol, and illicit/recreation drug usage reviewed. Travel safety precautions reviewed including avoiding travel where risk of congenitally transmitted infection(s) is high. Proper seat belt use discussed. Pregnancy packet/folder provided and review with patient. Information on childbirth classes/hospital facilities and Baby and Me resources provided. The patient was oriented to our practice, reviewed delivering physicians and [facility] for Delivery. All questions were answered. Patient to return to the office for first routine prenatal appointment with the provider. This appointment is scheduled. MyChart education provided. Patient instructed to always contact the office via phone with any urgent needs or concerns and not to utilize Mychart for emergencies.      Additional details that I feel the provider should be aware of: no addtional    Interviewed by: Sammy Willett LPN

## 2023-07-26 NOTE — PATIENT INSTRUCTIONS
Congratulations!! Please review our Pregnancy Essential Guide and [place] [facility] tour [facility].  [facility] Pregnancy Essentials [facility]  [facility] ([email or web address])      [address] [facility]. [facility] on [employer]          Nausea and Vomiting in Pregnancy   WHAT YOU NEED TO KNOW:   What do I need to know about nausea and vomiting in pregnancy? Nausea and vomiting can happen any time of day. These symptoms usually start before the 9th week of pregnancy, and end by the 14th week (second trimester). Some women can have nausea and vomiting for a longer time. These symptoms can make it hard for you to do your daily activities. What increases my risk for nausea and vomiting in pregnancy? Being pregnant with more than one baby    Nausea and vomiting in a past pregnancy    Having a sister or mother who had nausea and vomiting during pregnancy    History of migraine headaches or motion sickness    Being pregnant with a female baby    How is nausea and vomiting in pregnancy treated? Treatment for nausea and vomiting in pregnancy is usually not needed. You can make changes in the foods you eat and in your activities to help manage your symptoms. You may need to try several things to learn what works for you. Talk to your healthcare provider if your symptoms do not decrease with the changes suggested below. What nutrition changes can I make to manage nausea and vomiting? Eat smaller meals, more often. Eat a small snack, such as crackers, dry cereal, or a small sandwich before you go to bed. Eat some crackers or dry toast before you get out of bed in the morning. Get out of bed slowly. Sudden movements could cause you to get dizzy and nauseated. Eat bland foods when you feel nauseated. Examples of bland foods include dry toast, dry cereal, plain pasta, white rice, and bread. Other bland foods include saltine crackers, bananas, gelatin, and pretzels. Avoid spicy, greasy, and fried foods. Drink liquids that contain ginger. Drink ginger ale made with real ginger or ginger tea made with fresh grated ginger. Ginger capsules or ginger candies may also help to decrease nausea and vomiting. Drink liquids between meals instead of with meals. Wait at least 30 minutes after you eat to drink liquids. Drink small amounts of liquids often throughout the day to prevent dehydration. Ask how much liquid you should drink each day. What other changes can I make to manage nausea and vomiting? Avoid smells that bother you. Strong odors may cause nausea and vomiting to start, or make it worse. Do not brush your teeth right after you eat  if it makes you nauseated. Rest when you need to. Start activity slowly and work up to your usual routine as you start to feel better. Talk to your healthcare provider about your prenatal vitamins. Prenatal vitamins can cause nausea for some women. Try taking your prenatal vitamin at night or with a snack. If this change does not help, your healthcare provider may recommend a different type of vitamin. Light to moderate exercise  may help to decrease your symptoms. It may also help you to sleep better at night. Ask your healthcare provider about the best exercise plan for you. When should I seek immediate care? You are dizzy, cold, and thirsty, and your eyes and mouth are dry. You are urinating very little or not at all. You are dizzy or lightheaded when you stand up. You see blood or material that looks like coffee grounds in your vomit. When should I call my doctor? You vomit more than 4 times in 1 day. You have not been able to keep liquids down for more than 1 day. You lose more than 2 pounds. You have a fever. Your nausea and vomiting continue longer than 14 weeks. You have questions or concerns about your condition or care.     CARE AGREEMENT:   You have the right to help plan your care. Learn about your health condition and how it may be treated. Discuss treatment options with your healthcare providers to decide what care you want to receive. You always have the right to refuse treatment. The above information is an  only. It is not intended as medical advice for individual conditions or treatments. Talk to your doctor, nurse or pharmacist before following any medical regimen to see if it is safe and effective for you. © Copyright [employer] 2022 Information is for End User's use only and may not be sold, redistributed or otherwise used for commercial purposes. Pregnancy Diet   WHAT YOU NEED TO KNOW:   What is a healthy diet during pregnancy? A healthy diet during pregnancy is a meal plan that provides the amount of calories and nutrients you need during pregnancy. Your body needs extra calories and nutrients to support your growing baby. You need to gain the right amount of weight for a healthy baby and pregnancy. Babies born at a healthy weight have a lower risk of certain health problems at birth and later in life. A healthy diet may help you avoid gaining too much weight. Too much weight gain may cause problems for you during your pregnancy and delivery. What should I avoid or limit while I am pregnant? Do not drink alcohol during pregnancy. Alcohol can increase your risk of a miscarriage (losing your baby). Your baby may also be born too small and have other health problems, such as learning problems later in life. Do not eat raw or undercooked foods. Examples include meat, poultry, eggs, fish, and shellfish (shrimp, crab, lobster). Cook leftover foods and ready-to-eat foods such as hot dogs until they are steaming hot. Do not have anything that is not pasteurized. Pasteurization is a heating process that destroys bacteria. Do not have milk, juice, or cheese that has not been pasteurized.  Cheeses include Brie, feta, Camembert, blue, and [place] cheeses. Limit caffeine to avoid possible health problems. It is not clear how caffeine affects pregnancy. Your dietitian can tell you how much caffeine is okay for you to have in a day or week. Caffeine may be found in coffee, tea, cola, sports drinks, and chocolate. Limit foods that contain mercury. Mercury is naturally found in almost all types of fish and shellfish. Some types of fish absorb higher levels of mercury that can be harmful to an unborn baby. Eat only fish and shellfish that are low in mercury. Each week, you may eat up to 12 ounces of fish or shellfish that have low levels of mercury. These include shrimp, canned light tuna, salmon, pollock, and catfish. Eat only 6 ounces of albacore (white) tuna per week. Albacore tuna has more mercury than canned tuna. Do not eat shark, swordfish, lisa mackerel, or tilefish. Which foods can I eat while I am pregnant? Eat a variety of foods from each of the food groups listed below. Eat healthy foods even if you take a prenatal vitamin every day. Your dietitian will tell you how many servings you should have from each food group each day to get enough calories. The amount of calories you need depends on your daily activity, your weight before pregnancy, and current weight gain. In the first trimester, you usually do not need extra calories. In the second and third trimesters, most women should eat about 300 extra calories each day. Fruits and vegetables:  Half of your plate should contain fruits and vegetables. Fruits:  Choose fresh, canned, or dried fruit as often as possible. 1 cup of sliced, diced, cooked, or canned fruit (canned in light syrup or 100% juice)    1 large peach, orange, or banana    ½ cup of dried fruit    1 cup of fruit juice    Vegetables:  Eat more dark green, red, and orange vegetables. Dark green vegetables include broccoli, spinach, [place] lettuce, and marie greens.  Examples of orange and red vegetables are carrots, sweet potatoes, winter squash, oranges, and red peppers. 1 cup of cooked or raw vegetables    1 cup of vegetable juice    2 cups of raw leafy greens    Grains:  Half of the grains you eat each day should be whole grains. Whole grains:      ½ cup of cooked brown rice or cooked oatmeal    1 cup (1 ounce) of whole-grain dry cereal    1 slice of 623% whole-wheat or rye bread    3 cups of popped popcorn    Other grains:      ½ cup of cooked white rice or pasta    ½ of an English muffin    1 small flour or corn tortilla    1 mini-bagel    Dairy foods:  Choose fat-free or low-fat pasteurized dairy foods:    1½ ounces of hard cheese (mozzarella, Swiss, cheddar)    1 cup (8 ounces) of low-fat or fat-free milk or yogurt    1 cup of low-fat frozen yogurt or pudding    Meat and other protein sources:  Choose lean meats and poultry. Bake, broil, and grill meat instead of frying it. Include a variety of mercury-free seafood in place of some meat and poultry each week. Eat a variety of protein foods:    ½ ounce of nuts (12 almonds, 24 pistachios, 7 walnut halves) or 1 tablespoon of peanut butter (1 ounce)    ¼ cup of soy tofu or tempeh (1 ounce)    1 egg    ¼ cup of cooked dried beans, peas, or lentils (1 ounce)    1 small chicken breast or 1 small trout (about 3 ounces)    1 salmon steak (4 to 6 ounces)    1 small lean hamburger (2 to 3 ounces)    Fats:  Limit saturated fats, trans fats, and cholesterol. These unhealthy fats are found in shortening, butter, stick margarine, and animal fat. Choose healthy fats such as polyunsaturated and monounsaturated fats:    1 tablespoon of canola, olive, corn, sunflower, or soybean oil    1 tablespoon of soft margarine    1 teaspoon of mayonnaise    2 tablespoons of salad dressing    ½ of an avocado    What do I need to know about vitamin and mineral supplements? Your healthcare provider will tell you if you need a supplement and the type you should take.  Talk to your provider before you take any other kind of supplement, including herbal (natural) supplements. The following are general guidelines:  Folic acid is one of the most important vitamins during pregnancy. Your prenatal vitamins will also contain folic acid. Make sure you take your prenatal vitamin every day. If you forget to take your vitamin, do not take double the amount the next day. You need at least 653 mcg of folic acid each day before you get pregnant. Folic acid helps to form your baby's brain and spinal cord in early pregnancy. During pregnancy, your daily need for folic acid increases to about 600 mcg. Get folic acid each day by eating citrus fruits and juices, green leafy vegetables, liver, or dried beans. Folic acid is also added to foods such as breakfast cereals, bread products, flour, and pasta. You need about 30 mg of iron each day during pregnancy. Iron is a mineral the body needs to make hemoglobin, which is a part of red blood cells. Hemoglobin helps your blood carry oxygen from the lungs to the rest of your body. Foods that are good sources of iron are meat, poultry, fish, beans, spinach, and fortified cereals and breads. Your body will absorb iron better from non-meat sources if you have a source of vitamin C at the same time. Drink tea and coffee separately from iron-fortified foods and iron supplements. Calcium and vitamin D needs go up during pregnancy. Women who do not eat dairy products may need a calcium and vitamin D supplement. Talk to your dietitian about calcium supplements if you do not regularly eat good sources of calcium. The amount of calcium you need is about 1,300 mg if you are between 15and 25years old and 1,000 mg if you are 23to 48years old. If you cannot drink milk or eat dairy foods, try lactose-free or lactose-reduced milk or calcium-fortified soy milk. Ask your dietitian about pills you can take to help you digest milk products.  Eat other drinks and foods that are fortified with calcium, such as orange juice. What diet changes may help morning sickness? Morning sickness is common during the first few months of pregnancy. You may feel nauseated, and you may vomit several times each day. To improve symptoms of morning sickness, eat small meals often instead of 3 large meals. Foods high in carbohydrate, such as crackers, dry toast, and pasta, may be easier for you to eat. Drink liquids between meals rather than with meals. What diet changes may help constipation? A high-fiber diet can improve the symptoms of constipation. Whole-grain breakfast cereals, whole-grain breads, and prune juice are high in fiber. Raw fruits and vegetables, and cooked beans are also good sources of fiber. It may also be helpful to increase your intake of fluids and get regular physical activity. Talk with your healthcare provider before you begin any exercise program.       What diet changes may help heartburn? To improve the symptoms of heartburn, do not lie down right after you eat. When you do lie down, sleep with your head slightly elevated. Eat small, frequent meals instead of 3 large meals. It may also be helpful to avoid caffeine, chocolate, and spicy foods. What other healthy guidelines should I follow? Make sure you get enough protein, vitamin B12, and iron if you are a vegetarian or vegan. Some non-meat sources of these nutrients are fortified cereals, nut butters, soy products (tofu and soymilk), nuts, grains, and legumes. These nutrients are also found in eggs and milk products. Talk to your dietitian about how to manage cravings for certain foods. Foods that are high in calories, fat, and sugar should not replace healthy food choices. Some women have cravings for unusual substances such as flaco, dirt, laundry starch, ice, and chalk. This condition is called pica. These may lead to health problems such as anemia and cause other health problems.     When should I call my dietitian or obstetrician? You are losing weight without trying. You have cravings for substances such as flaco, dirt, laundry starch, or ice. You have questions or concerns about your condition or care. CARE AGREEMENT:   You have the right to help plan your care. Discuss treatment options with your healthcare provider to decide what care you want to receive. You always have the right to refuse treatment. The above information is an  only. It is not intended as medical advice for individual conditions or treatments. Talk to your doctor, nurse or pharmacist before following any medical regimen to see if it is safe and effective for you. © Copyright [employer] 2022 Information is for End User's use only and may not be sold, redistributed or otherwise used for commercial purposes. Pregnancy   WHAT YOU NEED TO KNOW:   What do I need to know about pregnancy? A normal pregnancy lasts about 40 weeks. The first trimester lasts from your last period through the 12th week of pregnancy. The second trimester lasts from the 13th week through the 23rd week. The third trimester lasts from the 24th week until your baby is born. If you know the date of your last period, your healthcare provider can estimate your due date. You may give birth to your baby any time from 37 weeks to 2 weeks after your due date. What is prenatal care? Prenatal care is a series of visits with your healthcare provider throughout your pregnancy. Prenatal care can help prevent problems during pregnancy and childbirth. At each prenatal visit, your healthcare provider will weigh you and check your blood pressure. He or she will also check your baby's heartbeat and growth. You may need the following at some visits:  A pelvic exam  allows your healthcare provider to see your cervix (the bottom part of your uterus). Your provider will use a speculum to open your vagina. He or she will check the size and shape of your uterus.  At your first prenatal visit, you may also have a Pap smear. This is a test to check your cervix for abnormal cells. Blood tests  may be done to check for any of the following:    Gestational diabetes or anemia (low iron level)    Blood type or Rh factor, or certain birth defects    Immunity to certain diseases, such as chickenpox or rubella    An infection, such as a sexually transmitted infection, HIV, or hepatitis B    Hepatitis B  may need to be prevented or treated. Hepatitis B is inflammation of the liver caused by the hepatitis B virus (HBV). HBV can spread from a mother to her baby during delivery. You will be checked for HBV as early as possible in the first trimester of each pregnancy. You need the test even if you received the hepatitis B vaccine or were tested before. You may need to have an HBV infection treated before you give birth. Urine tests  may also be done to check for sugar and protein. These can be signs of gestational diabetes or preeclampsia. Urine tests may also be done to check for signs of infection. A gestational diabetes screen  may be done. Your healthcare provider may order either a 1-step or 2-step oral glucose tolerance test (OGTT). 1-step OGTT:  Your blood sugar level will be tested after you have not eaten for 8 hours (fasting). You will then be given a glucose drink. Your level will be tested again 1 hour and 2 hours after you finish the drink. 2-step OGTT:  You do not have to fast for the first part of the test. You will have the glucose drink at any time of day. Your blood sugar level will be checked 1 hour later. If your blood sugar is higher than a certain level, another test will be ordered. You will fast and your blood sugar level will be tested. You will have the glucose drink. Your blood will be tested again 1 hour, 2 hours, and 3 hours after you finish the glucose drink. A fetal ultrasound  shows pictures of your baby inside your uterus.  The pictures are used to check your baby's development, movement, and position. Genetic disorder screening tests  may be offered to you. These tests check your baby's risk for genetic disorders such as Down syndrome. A screening test may include blood tests and an ultrasound. Blood tests may be used to check your DNA or your partner's DNA. Genetic tests are not always accurate or complete. Your baby may be born with a genetic disorder that did not show up in the tests. Talk to your healthcare provider about any concerns you have with genetic testing. What can I do to have a healthy pregnancy? Eat a variety of healthy foods. Healthy foods include fruits, vegetables, whole-grain breads, low-fat dairy foods, beans, lean meats, and fish. Drink liquids as directed. Ask how much liquid to drink each day and which liquids are best for you. Limit caffeine to less than 200 milligrams each day. Limit your intake of fish to 2 servings each week. Choose fish low in mercury such as canned light tuna, shrimp, crab, salmon, cod, or tilapia. Do not  eat fish high in mercury such as swordfish, tilefish, lisa mackerel, and shark. Take prenatal vitamins as directed. Your need for certain vitamins and minerals, such as folic acid, increases during pregnancy. Prenatal vitamins provide some of the extra vitamins and minerals you need. Prenatal vitamins may also help to decrease the risk for certain birth defects. Ask how much weight you should gain during your pregnancy. Too much or too little weight gain can be unhealthy for you and your baby. Talk to your healthcare provider about exercise. Moderate exercise can help you stay fit. Your healthcare provider will help you plan an exercise program that is safe for you during pregnancy. Do not smoke. Smoking increases your risk for a miscarriage and heart and blood vessel problems. Smoking can cause your baby to be born too early or weigh less at birth.  Quit smoking as soon as you think you might be pregnant. Ask your healthcare provider for information if you need help quitting. Do not drink alcohol. Alcohol passes from your body to your baby through the placenta. It can affect your baby's brain development and cause fetal alcohol syndrome (FAS). FAS is a group of conditions that causes mental, behavior, and growth problems. Talk to your healthcare provider before you take any medicines. Many medicines may harm your baby if you take them when you are pregnant. Do not take any medicines, vitamins, herbs, or supplements without first talking to your healthcare provider. Never use illegal or street drugs (such as marijuana or cocaine) while you are pregnant. What body changes may happen during my pregnancy? Breast changes  you will experience include tenderness and tingling during the early part of your pregnancy. Your breasts will become larger. You may need to use a support bra. You may see a thin, yellow fluid, called colostrum, leak from your nipples during the second trimester. Colostrum is a liquid that changes to milk about 3 days after you give birth. Skin changes and stretch marks  may occur during your pregnancy. You may have red marks, called stretch marks, on your skin. Stretch marks will usually fade after pregnancy. Use lotion if your skin is dry and itchy. The skin on your face, around your nipples, and below your belly button may darken. Most of the time, your skin will return to its normal color after your baby is born. Morning sickness  is nausea and vomiting that can happen at any time of day. Avoid fatty and spicy foods. Eat small meals throughout the day instead of large meals. Samantha may help to decrease nausea. Ask your healthcare provider about other ways of decreasing nausea and vomiting. Heartburn  may be caused by changes in your hormones during pregnancy.  Your growing uterus may also push your stomach upward and force stomach acid to back up into your esophagus. Eat 4 or 5 small meals each day instead of large meals. Avoid spicy foods. Avoid eating right before bedtime. Constipation  may develop during your pregnancy. To treat constipation, eat foods high in fiber such as fiber cereals, beans, fruits, vegetables, whole-grain breads, and prune juice. Get regular exercise and drink plenty of water. Your healthcare provider may also suggest a fiber supplement to soften your bowel movements. Talk to your healthcare provider before you use any medicines to decrease constipation. Hemorrhoids  are enlarged veins in the rectal area. They may cause pain, itching, and bright red bleeding from your rectum. To decrease your risk for hemorrhoids, prevent constipation and do not strain to have a bowel movement. If you have hemorrhoids, soak in a tub of warm water to ease discomfort. Ask your healthcare provider how you can treat hemorrhoids. Leg cramps and swelling  may be caused by low calcium levels or the added weight of pregnancy. Raise your legs above the level of your heart to decrease swelling. During a leg cramp, stretch or massage the muscle that has the cramp. Heat may help decrease pain and muscle spasms. Apply heat on your muscle for 20 to 30 minutes every 2 hours for as many days as directed. Back pain  may occur as your baby grows. Do not stand for long periods of time or lift heavy items. Use good posture while you stand, squat, or bend. Wear low-heeled shoes with good support. Rest may also help to relieve back pain. Ask your healthcare provider about exercises you can do to strengthen your back muscles. What are some safety tips during pregnancy? Avoid hot tubs and saunas. Do not use a hot tub or sauna while you are pregnant, especially during your first trimester. Hot tubs and saunas may raise your baby's temperature and increase the risk for birth defects. Avoid toxoplasmosis.   This is an infection caused by eating raw meat or being around infected cat feces. It can cause birth defects, miscarriages, and other problems. Wash your hands after you touch raw meat. Make sure any meat is well-cooked before you eat it. Avoid raw eggs and unpasteurized milk. Use gloves or ask someone else to clean your cat's litter box while you are pregnant. Ask your healthcare provider about travel. The most comfortable time to travel is during the second trimester. Ask your healthcare provider if you can travel after 36 weeks. You may not be able to travel in an airplane after 36 weeks. He or she may also recommend that you avoid long road trips. When should I seek immediate care? You develop a severe headache that does not go away. You have new or increased vision changes, such as blurred or spotted vision. You have new or increased swelling in your face or hands. You have pain or cramping in your abdomen or low back. You have vaginal bleeding. When should I call my doctor or obstetrician? You have abdominal cramps, pressure, or tightening. You have a change in vaginal discharge. You cannot keep food or drinks down, and you are losing weight. You have chills or a fever. You have vaginal itching, burning, or pain. You have yellow, green, white, or foul-smelling vaginal discharge. You have pain or burning when you urinate, less urine than usual, or pink or bloody urine. You have questions or concerns about your condition or care. CARE AGREEMENT:   You have the right to help plan your care. Learn about your health condition and how it may be treated. Discuss treatment options with your healthcare providers to decide what care you want to receive. You always have the right to refuse treatment. The above information is an  only. It is not intended as medical advice for individual conditions or treatments.  Talk to your doctor, nurse or pharmacist before following any medical regimen to see if it is safe and effective for you. © Copyright [employer]y Elmore 2022 Information is for End User's use only and may not be sold, redistributed or otherwise used for commercial purposes.

## 2023-07-28 ENCOUNTER — APPOINTMENT (OUTPATIENT)
Dept: LAB | Facility: CLINIC | Age: 33
End: 2023-07-28
Payer: COMMERCIAL

## 2023-07-28 DIAGNOSIS — Z34.01 ENCOUNTER FOR SUPERVISION OF NORMAL FIRST PREGNANCY IN FIRST TRIMESTER: ICD-10-CM

## 2023-07-28 LAB
ABO GROUP BLD: NORMAL
BACTERIA UR QL AUTO: ABNORMAL /HPF
BASOPHILS # BLD AUTO: 0.04 THOUSANDS/ÂΜL (ref 0–0.1)
BASOPHILS NFR BLD AUTO: 0 % (ref 0–1)
BILIRUB UR QL STRIP: NEGATIVE
BLD GP AB SCN SERPL QL: NEGATIVE
CLARITY UR: ABNORMAL
COLOR UR: YELLOW
EOSINOPHIL # BLD AUTO: 0.17 THOUSAND/ÂΜL (ref 0–0.61)
EOSINOPHIL NFR BLD AUTO: 2 % (ref 0–6)
ERYTHROCYTE [DISTWIDTH] IN BLOOD BY AUTOMATED COUNT: 12.4 % (ref 11.6–15.1)
GLUCOSE UR STRIP-MCNC: NEGATIVE MG/DL
HCT VFR BLD AUTO: 40.4 % (ref 34.8–46.1)
HGB BLD-MCNC: 13.5 G/DL (ref 11.5–15.4)
HGB UR QL STRIP.AUTO: NEGATIVE
IMM GRANULOCYTES # BLD AUTO: 0.03 THOUSAND/UL (ref 0–0.2)
IMM GRANULOCYTES NFR BLD AUTO: 0 % (ref 0–2)
KETONES UR STRIP-MCNC: NEGATIVE MG/DL
LEUKOCYTE ESTERASE UR QL STRIP: NEGATIVE
LYMPHOCYTES # BLD AUTO: 1.81 THOUSANDS/ÂΜL (ref 0.6–4.47)
LYMPHOCYTES NFR BLD AUTO: 19 % (ref 14–44)
MCH RBC QN AUTO: 33.1 PG (ref 26.8–34.3)
MCHC RBC AUTO-ENTMCNC: 33.4 G/DL (ref 31.4–37.4)
MCV RBC AUTO: 99 FL (ref 82–98)
MONOCYTES # BLD AUTO: 0.6 THOUSAND/ÂΜL (ref 0.17–1.22)
MONOCYTES NFR BLD AUTO: 6 % (ref 4–12)
MUCOUS THREADS UR QL AUTO: ABNORMAL
NEUTROPHILS # BLD AUTO: 6.77 THOUSANDS/ÂΜL (ref 1.85–7.62)
NEUTS SEG NFR BLD AUTO: 73 % (ref 43–75)
NITRITE UR QL STRIP: POSITIVE
NON-SQ EPI CELLS URNS QL MICRO: ABNORMAL /HPF
NRBC BLD AUTO-RTO: 0 /100 WBCS
PH UR STRIP.AUTO: 6.5 [PH]
PLATELET # BLD AUTO: 349 THOUSANDS/UL (ref 149–390)
PMV BLD AUTO: 11.9 FL (ref 8.9–12.7)
PROT UR STRIP-MCNC: ABNORMAL MG/DL
RBC # BLD AUTO: 4.08 MILLION/UL (ref 3.81–5.12)
RBC #/AREA URNS AUTO: ABNORMAL /HPF
RH BLD: POSITIVE
RUBV IGG SERPL IA-ACNC: 64.9 IU/ML
SP GR UR STRIP.AUTO: 1.02 (ref 1–1.03)
SPECIMEN EXPIRATION DATE: NORMAL
UROBILINOGEN UR STRIP-ACNC: <2 MG/DL
WBC # BLD AUTO: 9.42 THOUSAND/UL (ref 4.31–10.16)
WBC #/AREA URNS AUTO: ABNORMAL /HPF

## 2023-07-28 PROCEDURE — 86780 TREPONEMA PALLIDUM: CPT

## 2023-07-28 PROCEDURE — 87086 URINE CULTURE/COLONY COUNT: CPT

## 2023-07-28 PROCEDURE — 86901 BLOOD TYPING SEROLOGIC RH(D): CPT

## 2023-07-28 PROCEDURE — 86803 HEPATITIS C AB TEST: CPT

## 2023-07-28 PROCEDURE — 85025 COMPLETE CBC W/AUTO DIFF WBC: CPT

## 2023-07-28 PROCEDURE — 81001 URINALYSIS AUTO W/SCOPE: CPT

## 2023-07-28 PROCEDURE — 87389 HIV-1 AG W/HIV-1&-2 AB AG IA: CPT

## 2023-07-28 PROCEDURE — 83020 HEMOGLOBIN ELECTROPHORESIS: CPT

## 2023-07-28 PROCEDURE — 86900 BLOOD TYPING SEROLOGIC ABO: CPT

## 2023-07-28 PROCEDURE — 36415 COLL VENOUS BLD VENIPUNCTURE: CPT

## 2023-07-28 PROCEDURE — 86706 HEP B SURFACE ANTIBODY: CPT

## 2023-07-28 PROCEDURE — 86762 RUBELLA ANTIBODY: CPT

## 2023-07-28 PROCEDURE — 87340 HEPATITIS B SURFACE AG IA: CPT

## 2023-07-28 PROCEDURE — 86850 RBC ANTIBODY SCREEN: CPT

## 2023-07-29 LAB
BACTERIA UR CULT: NORMAL
HBV SURFACE AB SER-ACNC: 22.3 MIU/ML
HBV SURFACE AG SER QL: NORMAL
HCV AB SER QL: NORMAL
HIV 1+2 AB+HIV1 P24 AG SERPL QL IA: NORMAL
HIV 2 AB SERPL QL IA: NORMAL
HIV1 AB SERPL QL IA: NORMAL
HIV1 P24 AG SERPL QL IA: NORMAL
TREPONEMA PALLIDUM IGG+IGM AB [PRESENCE] IN SERUM OR PLASMA BY IMMUNOASSAY: NORMAL

## 2023-08-02 LAB
HGB A MFR BLD: 2.4 % (ref 1.8–3.2)
HGB A MFR BLD: 97.6 % (ref 96.4–98.8)
HGB F MFR BLD: 0 % (ref 0–2)
HGB FRACT BLD-IMP: NORMAL
HGB S MFR BLD: 0 %

## 2023-09-05 PROBLEM — Z34.91 ENCOUNTER FOR PREGNANCY RELATED EXAMINATION IN FIRST TRIMESTER: Status: ACTIVE | Noted: 2023-09-05

## 2023-09-05 NOTE — PROGRESS NOTES
Assessment & Plan  29 y.o. Curt Kerr at 13w3d presenting for routine prenatal visit. 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  right wnl. Female infertility       Other    Seasonal allergies    Encounter for pregnancy related examination in first trimester    Overview     Prenatal labs wnl  Contraception: Mirena IUD, post-placental          ____________________________________________________________  Subjective  She is without complaint. She denies contractions, loss of fluid, or vaginal bleeding. Objective  /66 (BP Location: Right arm, Patient Position: Sitting, Cuff Size: Standard)   Pulse 99   Ht 5' 1.5" (1.562 m)   Wt 76.2 kg (168 lb)   LMP 06/04/2023 (Exact Date)   BMI 31.23 kg/m²   FHR: 155 bpm    Physical Exam  Constitutional:       Appearance: Normal appearance. HENT:      Head: Normocephalic and atraumatic. Cardiovascular:      Rate and Rhythm: Normal rate. Pulmonary:      Effort: Pulmonary effort is normal. No respiratory distress. Abdominal:      Palpations: Abdomen is soft. Tenderness: There is no abdominal tenderness. Musculoskeletal:         General: Normal range of motion. Neurological:      Mental Status: She is alert. Skin:     General: Skin is warm and dry.           Patient's Active Problem List  Patient Active Problem List   Diagnosis   • Obstruction of fallopian tube   • Female infertility   • Seasonal allergies   • Encounter for pregnancy related examination in first trimester           Nicol Verdugo MD  9/6/2023  10:54 AM

## 2023-09-06 ENCOUNTER — ROUTINE PRENATAL (OUTPATIENT)
Dept: OBGYN CLINIC | Facility: CLINIC | Age: 33
End: 2023-09-06
Payer: COMMERCIAL

## 2023-09-06 VITALS
SYSTOLIC BLOOD PRESSURE: 110 MMHG | BODY MASS INDEX: 30.91 KG/M2 | HEIGHT: 62 IN | WEIGHT: 168 LBS | DIASTOLIC BLOOD PRESSURE: 66 MMHG | HEART RATE: 99 BPM

## 2023-09-06 DIAGNOSIS — Z34.91 ENCOUNTER FOR PREGNANCY RELATED EXAMINATION IN FIRST TRIMESTER: Primary | ICD-10-CM

## 2023-09-06 PROCEDURE — 99213 OFFICE O/P EST LOW 20 MIN: CPT | Performed by: OBSTETRICS & GYNECOLOGY

## 2023-09-07 ENCOUNTER — ROUTINE PRENATAL (OUTPATIENT)
Age: 33
End: 2023-09-07
Payer: COMMERCIAL

## 2023-09-07 VITALS
SYSTOLIC BLOOD PRESSURE: 116 MMHG | WEIGHT: 168.2 LBS | DIASTOLIC BLOOD PRESSURE: 68 MMHG | BODY MASS INDEX: 30.95 KG/M2 | HEIGHT: 62 IN | HEART RATE: 94 BPM

## 2023-09-07 DIAGNOSIS — Z34.01 ENCOUNTER FOR SUPERVISION OF NORMAL FIRST PREGNANCY IN FIRST TRIMESTER: ICD-10-CM

## 2023-09-07 DIAGNOSIS — Z3A.13 13 WEEKS GESTATION OF PREGNANCY: ICD-10-CM

## 2023-09-07 DIAGNOSIS — Z36.82 ENCOUNTER FOR (NT) NUCHAL TRANSLUCENCY SCAN: Primary | ICD-10-CM

## 2023-09-07 PROBLEM — O99.519 ASTHMA DURING PREGNANCY: Status: ACTIVE | Noted: 2023-09-07

## 2023-09-07 PROBLEM — J45.909 ASTHMA DURING PREGNANCY: Status: ACTIVE | Noted: 2023-09-07

## 2023-09-07 PROCEDURE — 76813 OB US NUCHAL MEAS 1 GEST: CPT | Performed by: OBSTETRICS & GYNECOLOGY

## 2023-09-07 NOTE — LETTER
September 7, 2023     Ivett Cardozo MD  [address] [facility]  [address]  [address] [facility] [address]    Patient: Melva Clark   YOB: 1990   Date of Visit: 9/7/2023       Dear Dr. Sierra Wu: Thank you for referring Abbie Jordan to me for evaluation. Below are my notes for this consultation. If you have questions, please do not hesitate to call me. I look forward to following your patient along with you. Sincerely,        Emmy Bennett MD        CC: No Recipients    Viviane Rachid  9/7/2023  9:08 AM  Sign when Signing Visit  Patient chose to have [facility] Non-invasive Prenatal Screen with fetal sex (per pt request). Patient given brochure and is aware [facility] will contact their insurance and coordinate coverage. Patient made aware she will need to respond to text message or e-mail from [address] within 2 business days or testing will be run through insurance. Patient informed text message will come from area code  "415". Provided [employer] # [phone] and web site : [email or web address]   "[facility] your test online" card with barcode and test tube ID provided to patient. Reviewed [facility]'s web site states 5-7 business days for results via their portal.   [employer] message will be sent to patient when [facility] receives results /provider reviews. 2 vials of blood drawn from  left  arm by Judith Kemp RN. Patient tolerated blood draw without difficulty. Specimens labeled with patient identifiers (name, date of birth, specimen collection date), order and specimen were verified with patient, packed and sent via [address]. FED EX  tracking #  [id number]  Copy of lab order scanned to Epic media. [facility] will have results in approximately 7-10 business days and will call patient or notify via [address] [place] [address]. Patient aware viewing lab result online will reveal fetal sex if ordered.     Patient verbalized understanding of all instructions and no questions at this time. Sona Houston MD  9/7/2023  9:34 AM  Sign when Signing Visit  [address]: Ms. Tavon Escalera was seen today at 13w4d for nuchal translucency ultrasound. See ultrasound report under "OB Procedures" tab. My recommendations are as follows:  1. We reviewed the availability of aneuploidy screening, as well as diagnostic testing, which are available to all pregnant women. We reviewed limitations, risks, and benefits of screening and testing. She elected to proceed with Non Invasive Prenatal Screening (NIPS). MSAFP screening should be ordered through your office at 15-20 weeks gestation, and completed prior to fetal anatomic survey. She does not wish to pursue diagnostic testing at this time. A detailed anatomic survey as well as transvaginal cervical length screening are recommended between 18-22 weeks gestation.       Please don't hesitate to contact our office with any concerns or questions.    -Sona Houston MD

## 2023-09-07 NOTE — PROGRESS NOTES
[address] [facility] [address] [facility] [address]: Ms. Giovana Ambrose was seen today at 13w4d for nuchal translucency ultrasound. See ultrasound report under "OB Procedures" tab. My recommendations are as follows:  1. We reviewed the availability of aneuploidy screening, as well as diagnostic testing, which are available to all pregnant women. We reviewed limitations, risks, and benefits of screening and testing. She elected to proceed with Non Invasive Prenatal Screening (NIPS). MSAFP screening should be ordered through your office at 15-20 weeks gestation, and completed prior to fetal anatomic survey. She does not wish to pursue diagnostic testing at this time. A detailed anatomic survey as well as transvaginal cervical length screening are recommended between 18-22 weeks gestation.       Please don't hesitate to contact our office with any concerns or questions.    -Julia Hernandez MD

## 2023-09-07 NOTE — PATIENT INSTRUCTIONS
You elected to have non-invasive prenatal screening (NIPS). This involves a blood test to check for the four most common genetic syndromes (Trisomy 21, Trisomy 13, Trisomy 25, and sex chromosome abnormalities). It also MAY report the biologic sex of the fetus if you opted to learn this information. You can call our office to verbally review results to avoid inadvertently learning this information via [employer], if desired. Results will be visible in your [employer] portal 7-10 business days from when the test is drawn. Please follow all instructions regarding insurance cost/coverage provided to you today. Please contact our office with any concerns or questions. You will need spina bifida screening (called MSAFP) for the baby beginning at 15 weeks gestation, which will be ordered by your obstetrician's office. This test allows for earlier detection of spina bifida than is possible by ultrasound, and is advised in all pregnancies.

## 2023-09-07 NOTE — PROGRESS NOTES
Patient chose to have [facility] Non-invasive Prenatal Screen with fetal sex (per pt request). Patient given brochure and is aware [facility] will contact their insurance and coordinate coverage. Patient made aware she will need to respond to text message or e-mail from [address] within 2 business days or testing will be run through insurance. Patient informed text message will come from area code  "415". Provided [employer] # [phone] and web site : [email or web address]   "[facility] your test online" card with barcode and test tube ID provided to patient. Reviewed [employer]'s web site states 5-7 business days for results via their portal.   [employer] message will be sent to patient when MFM receives results /provider reviews. 2 vials of blood drawn from  left  arm by Jaydon Lu RN. Patient tolerated blood draw without difficulty. Specimens labeled with patient identifiers (name, date of birth, specimen collection date), order and specimen were verified with patient, packed and sent via [address] [address]. FED EX  tracking #  [id number]  Copy of lab order scanned to Epic media. Maternal Fetal Medicine will have results in approximately 7-10 business days and will call patient or notify via [address] [place] [address]. Patient aware viewing lab result online will reveal fetal sex if ordered. Patient verbalized understanding of all instructions and no questions at this time.

## 2023-10-09 ENCOUNTER — TELEPHONE (OUTPATIENT)
Dept: OBGYN CLINIC | Facility: CLINIC | Age: 33
End: 2023-10-09

## 2023-10-09 NOTE — TELEPHONE ENCOUNTER
Call from pt. The patient reports experiencing numbness and tingling in their upper extremities for the past three days, specifically occurring at night. No additional symptoms have been reported, and she has not attempted any remedies or treatments. Their next scheduled OB visit with Dr. Fausto Mott is on 10/24.

## 2023-10-09 NOTE — TELEPHONE ENCOUNTER
Called patient to discuss her symptoms. She states that when she wakes up, her fingers are numb and tingly; the sensation eventually goes away, but lasts longer in her thumb. Her  strength is unaffected. She otherwise feels well. We discussed how this may be a sign of carpal tunnel syndrome, and that she can try wearing bilateral braces at night.     Afua Singh MD  10/9/2023  12:41 PM

## 2023-10-24 ENCOUNTER — ROUTINE PRENATAL (OUTPATIENT)
Dept: OBGYN CLINIC | Facility: CLINIC | Age: 33
End: 2023-10-24

## 2023-10-24 ENCOUNTER — ROUTINE PRENATAL (OUTPATIENT)
Age: 33
End: 2023-10-24
Payer: COMMERCIAL

## 2023-10-24 VITALS
HEIGHT: 61 IN | BODY MASS INDEX: 32.55 KG/M2 | SYSTOLIC BLOOD PRESSURE: 100 MMHG | DIASTOLIC BLOOD PRESSURE: 60 MMHG | WEIGHT: 172.4 LBS

## 2023-10-24 VITALS
WEIGHT: 170.8 LBS | BODY MASS INDEX: 31.43 KG/M2 | SYSTOLIC BLOOD PRESSURE: 106 MMHG | DIASTOLIC BLOOD PRESSURE: 64 MMHG | HEART RATE: 103 BPM | HEIGHT: 62 IN

## 2023-10-24 DIAGNOSIS — Z36.86 ENCOUNTER FOR ANTENATAL SCREENING FOR CERVICAL LENGTH: ICD-10-CM

## 2023-10-24 DIAGNOSIS — O44.40 LOW-LYING PLACENTA: ICD-10-CM

## 2023-10-24 DIAGNOSIS — O99.519 ASTHMA DURING PREGNANCY: Primary | ICD-10-CM

## 2023-10-24 DIAGNOSIS — O44.42 LOW-LYING PLACENTA WITHOUT HEMORRHAGE, SECOND TRIMESTER: ICD-10-CM

## 2023-10-24 DIAGNOSIS — Z3A.20 20 WEEKS GESTATION OF PREGNANCY: ICD-10-CM

## 2023-10-24 DIAGNOSIS — Z36.3 ENCOUNTER FOR ANTENATAL SCREENING FOR MALFORMATIONS: Primary | ICD-10-CM

## 2023-10-24 DIAGNOSIS — J45.909 ASTHMA DURING PREGNANCY: Primary | ICD-10-CM

## 2023-10-24 DIAGNOSIS — Z23 NEED FOR INFLUENZA VACCINATION: ICD-10-CM

## 2023-10-24 PROCEDURE — 76805 OB US >/= 14 WKS SNGL FETUS: CPT | Performed by: OBSTETRICS & GYNECOLOGY

## 2023-10-24 PROCEDURE — 87491 CHLMYD TRACH DNA AMP PROBE: CPT | Performed by: OBSTETRICS & GYNECOLOGY

## 2023-10-24 PROCEDURE — 87591 N.GONORRHOEAE DNA AMP PROB: CPT | Performed by: OBSTETRICS & GYNECOLOGY

## 2023-10-24 PROCEDURE — 76817 TRANSVAGINAL US OBSTETRIC: CPT | Performed by: OBSTETRICS & GYNECOLOGY

## 2023-10-24 NOTE — LETTER
October 24, 2023     Kristofer Nash, [address]    Patient: Rik Dumont   YOB: 1990   Date of Visit: 10/24/2023       Dear Dr. Lisa Gallegos: Thank you for referring Rowena Carson to me for evaluation. Below are my notes for this consultation. If you have questions, please do not hesitate to call me. I look forward to following your patient along with you.          Sincerely,        Duarte Shipman MD        CC: No Recipients    Duarte Shipman MD  10/24/2023  8:33 AM  Sign when Signing Visit  Please refer to the [facility] ultrasound report in Ob Procedures for additional information regarding today's visit

## 2023-10-24 NOTE — PROGRESS NOTES
Pt is a 29 y.o.  20w2d  Pregnancy is complicated by Asthma, low lying placenta  Pt denies quickening. Denies vb, lof, ctx. PTL precautions reviewed  Ch/thais not obtained in this pregnancy--reviewed with patient and obtained today  Influenza vaccination today  Had anatomy scan today  F/U 4 weeks.

## 2023-10-24 NOTE — PROGRESS NOTES
Ultrasound Probe Disinfection    A transvaginal ultrasound was performed. Prior to use, disinfection was performed with High Level Disinfection Process ([employer]). Probe serial number S1: [id number] was used.       Sharon Norris  10/24/23  8:07 AM

## 2023-10-25 LAB
C TRACH DNA SPEC QL NAA+PROBE: NEGATIVE
N GONORRHOEA DNA SPEC QL NAA+PROBE: NEGATIVE

## 2023-11-27 ENCOUNTER — ROUTINE PRENATAL (OUTPATIENT)
Dept: OBGYN CLINIC | Facility: CLINIC | Age: 33
End: 2023-11-27
Payer: COMMERCIAL

## 2023-11-27 VITALS
SYSTOLIC BLOOD PRESSURE: 102 MMHG | BODY MASS INDEX: 33.19 KG/M2 | DIASTOLIC BLOOD PRESSURE: 68 MMHG | HEIGHT: 61 IN | WEIGHT: 175.8 LBS

## 2023-11-27 DIAGNOSIS — J45.909 ASTHMA AFFECTING PREGNANCY IN SECOND TRIMESTER: ICD-10-CM

## 2023-11-27 DIAGNOSIS — Z3A.28 28 WEEKS GESTATION OF PREGNANCY: Primary | ICD-10-CM

## 2023-11-27 DIAGNOSIS — O99.512 ASTHMA AFFECTING PREGNANCY IN SECOND TRIMESTER: ICD-10-CM

## 2023-11-27 DIAGNOSIS — O44.42 LOW-LYING PLACENTA WITHOUT HEMORRHAGE, SECOND TRIMESTER: ICD-10-CM

## 2023-11-27 PROCEDURE — 99213 OFFICE O/P EST LOW 20 MIN: CPT | Performed by: NURSE PRACTITIONER

## 2023-11-27 NOTE — PROGRESS NOTES
35 yo  at 25w1dw  Asthma, low lying placenta    Tommye Adjutant 2 US normal but incomplete; rpt at [address] [facility] for anatomy and placenta-- scheduled 24. Baby is active, denies leakage of fluid, vaginal bleeding or uterine contractions. S=D, normal FHTs, normal BP    28w lab order provided.   RV 3w

## 2023-12-14 PROBLEM — Z3A.27 27 WEEKS GESTATION OF PREGNANCY: Status: ACTIVE | Noted: 2023-09-07

## 2023-12-29 ENCOUNTER — TELEPHONE (OUTPATIENT)
Dept: OBGYN CLINIC | Facility: CLINIC | Age: 33
End: 2023-12-29

## 2024-01-10 ENCOUNTER — ROUTINE PRENATAL (OUTPATIENT)
Dept: OBGYN CLINIC | Facility: CLINIC | Age: 34
End: 2024-01-10

## 2024-01-10 VITALS
SYSTOLIC BLOOD PRESSURE: 124 MMHG | DIASTOLIC BLOOD PRESSURE: 72 MMHG | BODY MASS INDEX: 32.02 KG/M2 | HEIGHT: 61 IN | WEIGHT: 169.6 LBS

## 2024-01-10 DIAGNOSIS — O99.513 ASTHMA AFFECTING PREGNANCY IN THIRD TRIMESTER: Primary | ICD-10-CM

## 2024-01-10 DIAGNOSIS — O44.40 LOW-LYING PLACENTA: ICD-10-CM

## 2024-01-10 DIAGNOSIS — J45.909 ASTHMA AFFECTING PREGNANCY IN THIRD TRIMESTER: Primary | ICD-10-CM

## 2024-01-10 PROCEDURE — PNV: Performed by: NURSE PRACTITIONER

## 2024-01-10 NOTE — PROGRESS NOTES
32 yo  at 31w3d gestation.  Asthma, low lying placenta  Patient presents accompanied by security custodial guards, as she is currently incarcerated.  She will be released next week.    28w labs: not completed  32w US to complete anatomy and reassess placenta.    Advised of need for 28w labs ASAP.  Patient states she will do them next week after her release.  She has an appt 24 with Dr. Fraire at which time she needs to sign consents/ TDAP.  She has a growth scan on 24 for fetal growth with MFM.    Baby is active, denies leakage of fluid, vaginal bleeding or uterine contractions.  S=D, normal FHTs, normal BP    Reviewed FKCs and PTL precautions.  RV as above-- due to recent incarceration, repeat STI screening at this visit.

## 2024-01-15 PROBLEM — Z3A.32 32 WEEKS GESTATION OF PREGNANCY: Status: ACTIVE | Noted: 2023-09-07

## 2024-01-23 ENCOUNTER — ROUTINE PRENATAL (OUTPATIENT)
Dept: OBGYN CLINIC | Facility: CLINIC | Age: 34
End: 2024-01-23
Payer: COMMERCIAL

## 2024-01-23 VITALS
SYSTOLIC BLOOD PRESSURE: 116 MMHG | WEIGHT: 173 LBS | HEIGHT: 61 IN | BODY MASS INDEX: 32.66 KG/M2 | HEART RATE: 101 BPM | DIASTOLIC BLOOD PRESSURE: 58 MMHG

## 2024-01-23 DIAGNOSIS — J45.909 ASTHMA AFFECTING PREGNANCY IN THIRD TRIMESTER: Primary | ICD-10-CM

## 2024-01-23 DIAGNOSIS — Z23 NEED FOR TDAP VACCINATION: ICD-10-CM

## 2024-01-23 DIAGNOSIS — O44.40 LOW-LYING PLACENTA: ICD-10-CM

## 2024-01-23 DIAGNOSIS — Z3A.33 33 WEEKS GESTATION OF PREGNANCY: ICD-10-CM

## 2024-01-23 DIAGNOSIS — O99.513 ASTHMA AFFECTING PREGNANCY IN THIRD TRIMESTER: Primary | ICD-10-CM

## 2024-01-23 PROCEDURE — 90471 IMMUNIZATION ADMIN: CPT | Performed by: OBSTETRICS & GYNECOLOGY

## 2024-01-23 PROCEDURE — 90715 TDAP VACCINE 7 YRS/> IM: CPT | Performed by: OBSTETRICS & GYNECOLOGY

## 2024-01-23 PROCEDURE — 99213 OFFICE O/P EST LOW 20 MIN: CPT | Performed by: OBSTETRICS & GYNECOLOGY

## 2024-01-23 NOTE — PROGRESS NOTES
Pt is a 33 y.o.  33w2d  Pregnancy is complicated by asthma, low lying placenta    Pt reports +FM. Denies vb, lof, ctx. PTL precautions and fkc reviewed    3d trimester labs still not complete. Pt reports she will go this weekend.  Pt rescheduled her MFM u/s to 24--reviewed importance of assessing placental location    3d trimester folder reviewed and given to patient  Delivery consent reviewed and signed  TDAP administered    F/U 2 weeks.

## 2024-01-28 NOTE — PROGRESS NOTES
Please refer to the [facility] ultrasound report in Ob Procedures for additional information regarding today's visit

## 2024-01-29 ENCOUNTER — LAB (OUTPATIENT)
Dept: LAB | Facility: HOSPITAL | Age: 34
End: 2024-01-29
Payer: COMMERCIAL

## 2024-01-29 DIAGNOSIS — Z3A.28 28 WEEKS GESTATION OF PREGNANCY: ICD-10-CM

## 2024-01-29 LAB
BASOPHILS # BLD AUTO: 0.03 THOUSANDS/ÂΜL (ref 0–0.1)
BASOPHILS NFR BLD AUTO: 0 % (ref 0–1)
EOSINOPHIL # BLD AUTO: 0.19 THOUSAND/ÂΜL (ref 0–0.61)
EOSINOPHIL NFR BLD AUTO: 2 % (ref 0–6)
ERYTHROCYTE [DISTWIDTH] IN BLOOD BY AUTOMATED COUNT: 13.5 % (ref 11.6–15.1)
GLUCOSE 1H P 50 G GLC PO SERPL-MCNC: 128 MG/DL (ref 40–134)
HCT VFR BLD AUTO: 33 % (ref 34.8–46.1)
HGB BLD-MCNC: 11.2 G/DL (ref 11.5–15.4)
IMM GRANULOCYTES # BLD AUTO: 0.07 THOUSAND/UL (ref 0–0.2)
IMM GRANULOCYTES NFR BLD AUTO: 1 % (ref 0–2)
LYMPHOCYTES # BLD AUTO: 1.45 THOUSANDS/ÂΜL (ref 0.6–4.47)
LYMPHOCYTES NFR BLD AUTO: 16 % (ref 14–44)
MCH RBC QN AUTO: 31.7 PG (ref 26.8–34.3)
MCHC RBC AUTO-ENTMCNC: 33.9 G/DL (ref 31.4–37.4)
MCV RBC AUTO: 94 FL (ref 82–98)
MONOCYTES # BLD AUTO: 0.46 THOUSAND/ÂΜL (ref 0.17–1.22)
MONOCYTES NFR BLD AUTO: 5 % (ref 4–12)
NEUTROPHILS # BLD AUTO: 6.96 THOUSANDS/ÂΜL (ref 1.85–7.62)
NEUTS SEG NFR BLD AUTO: 76 % (ref 43–75)
NRBC BLD AUTO-RTO: 0 /100 WBCS
PLATELET # BLD AUTO: 270 THOUSANDS/UL (ref 149–390)
PMV BLD AUTO: 11.9 FL (ref 8.9–12.7)
RBC # BLD AUTO: 3.53 MILLION/UL (ref 3.81–5.12)
TREPONEMA PALLIDUM IGG+IGM AB [PRESENCE] IN SERUM OR PLASMA BY IMMUNOASSAY: NORMAL
WBC # BLD AUTO: 9.16 THOUSAND/UL (ref 4.31–10.16)

## 2024-01-29 PROCEDURE — 36415 COLL VENOUS BLD VENIPUNCTURE: CPT

## 2024-01-29 PROCEDURE — 85025 COMPLETE CBC W/AUTO DIFF WBC: CPT

## 2024-01-29 PROCEDURE — 86780 TREPONEMA PALLIDUM: CPT

## 2024-01-29 PROCEDURE — 82950 GLUCOSE TEST: CPT

## 2024-01-30 ENCOUNTER — ULTRASOUND (OUTPATIENT)
Age: 34
End: 2024-01-30
Payer: COMMERCIAL

## 2024-01-30 VITALS
HEART RATE: 98 BPM | BODY MASS INDEX: 33.38 KG/M2 | HEIGHT: 61 IN | SYSTOLIC BLOOD PRESSURE: 122 MMHG | WEIGHT: 176.8 LBS | DIASTOLIC BLOOD PRESSURE: 74 MMHG

## 2024-01-30 DIAGNOSIS — O99.213 MATERNAL OBESITY, ANTEPARTUM, THIRD TRIMESTER: ICD-10-CM

## 2024-01-30 DIAGNOSIS — J45.909 ASTHMA AFFECTING PREGNANCY IN THIRD TRIMESTER: ICD-10-CM

## 2024-01-30 DIAGNOSIS — O44.42 LOW-LYING PLACENTA WITHOUT HEMORRHAGE, SECOND TRIMESTER: ICD-10-CM

## 2024-01-30 DIAGNOSIS — O99.513 ASTHMA AFFECTING PREGNANCY IN THIRD TRIMESTER: ICD-10-CM

## 2024-01-30 DIAGNOSIS — Z3A.34 34 WEEKS GESTATION OF PREGNANCY: Primary | ICD-10-CM

## 2024-01-30 PROCEDURE — 76817 TRANSVAGINAL US OBSTETRIC: CPT | Performed by: OBSTETRICS & GYNECOLOGY

## 2024-01-30 PROCEDURE — 76816 OB US FOLLOW-UP PER FETUS: CPT | Performed by: OBSTETRICS & GYNECOLOGY

## 2024-01-30 NOTE — PROGRESS NOTES
Ultrasound Probe Disinfection    A transvaginal ultrasound was performed.   Prior to use, disinfection was performed with High Level Disinfection Process ([employer]).  Probe serial number S2: [id number] was used.      Misty Mitchell  01/30/24  12:58 PM

## 2024-01-30 NOTE — RESULT ENCOUNTER NOTE
1 hour glucose tolerance normal, syphilis screen negative.  Mild anemia-- add iron supplement daily.

## 2024-01-30 NOTE — LETTER
January 30, 2024     Jahaira Hand MD  [address] [facility]  [address]  [facility] [address]    Patient: Sandra Fishman   YOB: 1990   Date of Visit: 1/30/2024       Dear Dr. Hand:    Thank you for referring Sandra Fishman to me for evaluation. Below are my notes for this consultation.    If you have questions, please do not hesitate to call me. I look forward to following your patient along with you.         Sincerely,        Fortino Rodríguez MD        CC: No Recipients    Fortino Rodríguez MD  1/30/2024 12:51 PM  Sign when Signing Visit  Please refer to the [facility] ultrasound report in Ob Procedures for additional information regarding today's visit

## 2024-02-06 ENCOUNTER — TELEPHONE (OUTPATIENT)
Dept: OBGYN CLINIC | Facility: CLINIC | Age: 34
End: 2024-02-06

## 2024-02-12 ENCOUNTER — ROUTINE PRENATAL (OUTPATIENT)
Dept: OBGYN CLINIC | Facility: CLINIC | Age: 34
End: 2024-02-12
Payer: COMMERCIAL

## 2024-02-12 VITALS
OXYGEN SATURATION: 99 % | DIASTOLIC BLOOD PRESSURE: 80 MMHG | WEIGHT: 181.2 LBS | BODY MASS INDEX: 34.21 KG/M2 | SYSTOLIC BLOOD PRESSURE: 122 MMHG | HEIGHT: 61 IN

## 2024-02-12 DIAGNOSIS — Z34.93 ENCOUNTER FOR PREGNANCY RELATED EXAMINATION, THIRD TRIMESTER: Primary | ICD-10-CM

## 2024-02-12 PROBLEM — Z34.91 ENCOUNTER FOR PREGNANCY RELATED EXAMINATION IN FIRST TRIMESTER: Status: RESOLVED | Noted: 2023-09-05 | Resolved: 2024-02-12

## 2024-02-12 PROBLEM — Z3A.36 36 WEEKS GESTATION OF PREGNANCY: Status: ACTIVE | Noted: 2023-09-07

## 2024-02-12 PROCEDURE — 87150 DNA/RNA AMPLIFIED PROBE: CPT | Performed by: OBSTETRICS & GYNECOLOGY

## 2024-02-12 PROCEDURE — 99213 OFFICE O/P EST LOW 20 MIN: CPT | Performed by: OBSTETRICS & GYNECOLOGY

## 2024-02-12 NOTE — PROGRESS NOTES
"Assessment & Plan  33 y.o.  at 36w1d presenting for routine prenatal visit.     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  right wnl.         Female infertility    Seasonal allergies    36 weeks gestation of pregnancy    Overview     Prenatal labs wnl  Contraception: post-placental Mirena IUD  Low lying placenta: resolved  28 week labs ordered, needs to complete  US 24: EFW 20%, VTX, placenta anterior         Asthma during pregnancy    Low-lying placenta    Overview     Resolved         Asthma affecting pregnancy in third trimester    Imprisonment and other incarceration     Other Visit Diagnoses       Encounter for pregnancy related examination, third trimester    -  Primary          ____________________________________________________________  Subjective  She is without complaint.   She denies contractions, loss of fluid, or vaginal bleeding.   She feels regular fetal movements.     Objective  /80 (BP Location: Left arm, Patient Position: Sitting, Cuff Size: Standard)   Ht 5' 1\" (1.549 m)   Wt 82.2 kg (181 lb 3.2 oz)   LMP 2023 (Exact Date)   SpO2 99%   BMI 34.24 kg/m²   FHR: 125 bpm    Physical Exam  Constitutional:       Appearance: Normal appearance.   Genitourinary:      Vulva normal.   HENT:      Head: Normocephalic and atraumatic.   Cardiovascular:      Rate and Rhythm: Normal rate.   Pulmonary:      Effort: Pulmonary effort is normal. No respiratory distress.   Abdominal:      Palpations: Abdomen is soft.      Tenderness: There is no abdominal tenderness.   Musculoskeletal:         General: Normal range of motion.   Neurological:      Mental Status: She is alert.   Skin:     General: Skin is warm and dry.          Patient's Active Problem List  Patient Active Problem List   Diagnosis    Obstruction of fallopian tube    Female infertility    Seasonal allergies    36 weeks gestation of pregnancy    Asthma " during pregnancy    Low-lying placenta    Asthma affecting pregnancy in third trimester    Imprisonment and other incarceration           Alisha Fraire MD  2/12/2024  9:32 AM

## 2024-02-15 LAB — GP B STREP DNA SPEC QL NAA+PROBE: NEGATIVE

## 2024-02-23 ENCOUNTER — ROUTINE PRENATAL (OUTPATIENT)
Dept: OBGYN CLINIC | Facility: CLINIC | Age: 34
End: 2024-02-23
Payer: COMMERCIAL

## 2024-02-23 VITALS
HEIGHT: 61 IN | DIASTOLIC BLOOD PRESSURE: 74 MMHG | BODY MASS INDEX: 33.79 KG/M2 | WEIGHT: 179 LBS | SYSTOLIC BLOOD PRESSURE: 122 MMHG | HEART RATE: 101 BPM

## 2024-02-23 DIAGNOSIS — O99.513 ASTHMA AFFECTING PREGNANCY IN THIRD TRIMESTER: Primary | ICD-10-CM

## 2024-02-23 DIAGNOSIS — J45.909 ASTHMA AFFECTING PREGNANCY IN THIRD TRIMESTER: Primary | ICD-10-CM

## 2024-02-23 DIAGNOSIS — Z3A.37 37 WEEKS GESTATION OF PREGNANCY: ICD-10-CM

## 2024-02-23 PROCEDURE — 99213 OFFICE O/P EST LOW 20 MIN: CPT | Performed by: OBSTETRICS & GYNECOLOGY

## 2024-02-23 NOTE — PROGRESS NOTES
Assessment & Plan  33 y.o.  at 37w5d presenting for routine prenatal visit.       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  right wnl.         Female infertility    Seasonal allergies    37 weeks gestation of pregnancy    Overview     Prenatal labs wnl  Contraception: post-placental Mirena IUD  Low lying placenta: resolved  28 week labs wnl  US 24: EFW 20%, VTX, placenta anterior  Delivery planning?         Asthma during pregnancy    Low-lying placenta    Overview     Resolved         Asthma affecting pregnancy in third trimester    Imprisonment and other incarceration     ____________________________________________________________  Subjective  She is without complaint.   She denies contractions, loss of fluid, or vaginal bleeding.   She feels regular fetal movements.       Objective  LMP 2023 (Exact Date)   FHR: 140's via doppler    Physical Exam  Constitutional:       Appearance: She is well-developed.   Cardiovascular:      Rate and Rhythm: Normal rate and regular rhythm.      Heart sounds: Normal heart sounds. No murmur heard.     No friction rub. No gallop.   Pulmonary:      Effort: Pulmonary effort is normal. No respiratory distress.      Breath sounds: No wheezing.   Abdominal:      Palpations: Abdomen is soft.      Tenderness: There is no abdominal tenderness.   Musculoskeletal:         General: No tenderness.   Neurological:      Mental Status: She is alert and oriented to person, place, and time.   Vitals reviewed.          Patient's Active Problem List  Patient Active Problem List   Diagnosis    Obstruction of fallopian tube    Female infertility    Seasonal allergies    37 weeks gestation of pregnancy    Asthma during pregnancy    Low-lying placenta    Asthma affecting pregnancy in third trimester    Imprisonment and other incarceration           Vivienne Wallace MD  2024  2:42 PM

## 2024-03-01 ENCOUNTER — ROUTINE PRENATAL (OUTPATIENT)
Dept: OBGYN CLINIC | Facility: CLINIC | Age: 34
End: 2024-03-01
Payer: COMMERCIAL

## 2024-03-01 VITALS
HEART RATE: 116 BPM | HEIGHT: 61 IN | WEIGHT: 181 LBS | DIASTOLIC BLOOD PRESSURE: 74 MMHG | SYSTOLIC BLOOD PRESSURE: 126 MMHG | BODY MASS INDEX: 34.17 KG/M2

## 2024-03-01 DIAGNOSIS — Z3A.38 38 WEEKS GESTATION OF PREGNANCY: ICD-10-CM

## 2024-03-01 DIAGNOSIS — O99.513 ASTHMA AFFECTING PREGNANCY IN THIRD TRIMESTER: Primary | ICD-10-CM

## 2024-03-01 DIAGNOSIS — J45.909 ASTHMA AFFECTING PREGNANCY IN THIRD TRIMESTER: Primary | ICD-10-CM

## 2024-03-01 PROBLEM — O44.40 LOW-LYING PLACENTA: Status: RESOLVED | Noted: 2023-10-24 | Resolved: 2024-03-01

## 2024-03-01 PROCEDURE — 99213 OFFICE O/P EST LOW 20 MIN: CPT | Performed by: OBSTETRICS & GYNECOLOGY

## 2024-03-01 NOTE — Clinical Note
1. Meaghan please call induction on Monday for 3/9 either 8 pm or 9pm. It will be elective and she needs cervical ripening 2. Alisha--pt will be delivering with you on 3/10

## 2024-03-01 NOTE — PROGRESS NOTES
Pt is a 33 y.o.  38w5d  Pregnancy is complicated by asthma, low lying placenta (now resolved)    Pt reports +FM. Denies vb, lof, ctx. Notes rare cramping. Labor precautions  and fkc reviewed.    Pt desires IOL with delivery on 3/10. IOL consent reviewed and signed.    SVE: /-2, mid consistency, midposition. Plan for IOL  in the evening.

## 2024-03-04 ENCOUNTER — NURSE TRIAGE (OUTPATIENT)
Dept: OTHER | Facility: OTHER | Age: 34
End: 2024-03-04

## 2024-03-04 ENCOUNTER — ANESTHESIA EVENT (INPATIENT)
Dept: LABOR AND DELIVERY | Facility: HOSPITAL | Age: 34
DRG: 540 | End: 2024-03-04
Payer: COMMERCIAL

## 2024-03-04 ENCOUNTER — HOSPITAL ENCOUNTER (INPATIENT)
Facility: HOSPITAL | Age: 34
LOS: 2 days | Discharge: HOME | DRG: 540 | End: 2024-03-06
Attending: OBSTETRICS & GYNECOLOGY | Admitting: OBSTETRICS & GYNECOLOGY
Payer: COMMERCIAL

## 2024-03-04 DIAGNOSIS — Z98.891 STATUS POST PRIMARY LOW TRANSVERSE CESAREAN SECTION: Primary | ICD-10-CM

## 2024-03-04 PROBLEM — D64.9 ANEMIA: Status: ACTIVE | Noted: 2024-03-04

## 2024-03-04 PROBLEM — O42.90 PREMATURE RUPTURE OF MEMBRANES: Status: ACTIVE | Noted: 2024-03-04

## 2024-03-04 LAB
ABO GROUP BLD: NORMAL
BASE EXCESS BLDCOA CALC-SCNC: -4 MMOL/L (ref 3–11)
BASE EXCESS BLDCOV CALC-SCNC: -3.1 MMOL/L (ref 1–9)
BLD GP AB SCN SERPL QL: NEGATIVE
ERYTHROCYTE [DISTWIDTH] IN BLOOD BY AUTOMATED COUNT: 13.2 % (ref 11.6–15.1)
HCO3 BLDCOA-SCNC: 23.9 MMOL/L (ref 17.3–27.3)
HCO3 BLDCOV-SCNC: 22.2 MMOL/L (ref 12.2–28.6)
HCT VFR BLD AUTO: 32.8 % (ref 34.8–46.1)
HGB BLD-MCNC: 10.9 G/DL (ref 11.5–15.4)
HOLD SPECIMEN: YES
MCH RBC QN AUTO: 30.4 PG (ref 26.8–34.3)
MCHC RBC AUTO-ENTMCNC: 33.2 G/DL (ref 31.4–37.4)
MCV RBC AUTO: 92 FL (ref 82–98)
O2 CT VFR BLDCOA CALC: 8.9 ML/DL
OXYHGB MFR BLDCOA: 40 %
OXYHGB MFR BLDCOV: 58.9 %
PCO2 BLDCOA: 54 MM[HG] (ref 30–60)
PCO2 BLDCOV: 40.9 MM HG (ref 27–43)
PH BLDCOA: 7.26 [PH] (ref 7.23–7.43)
PH BLDCOV: 7.35 [PH] (ref 7.19–7.49)
PLATELET # BLD AUTO: 237 THOUSANDS/UL (ref 149–390)
PMV BLD AUTO: 12.2 FL (ref 8.9–12.7)
PO2 BLDCOA: 21.1 MM HG (ref 5–25)
PO2 BLDCOV: 25 MM HG (ref 15–45)
RBC # BLD AUTO: 3.58 MILLION/UL (ref 3.81–5.12)
RH BLD: POSITIVE
SAO2 % BLDCOV: 13.3 ML/DL
SPECIMEN EXPIRATION DATE: NORMAL
TREPONEMA PALLIDUM IGG+IGM AB [PRESENCE] IN SERUM OR PLASMA BY IMMUNOASSAY: NORMAL
WBC # BLD AUTO: 9.93 THOUSAND/UL (ref 4.31–10.16)

## 2024-03-04 PROCEDURE — 86850 RBC ANTIBODY SCREEN: CPT

## 2024-03-04 PROCEDURE — 85027 COMPLETE CBC AUTOMATED: CPT

## 2024-03-04 PROCEDURE — NC001 PR NO CHARGE: Performed by: OBSTETRICS & GYNECOLOGY

## 2024-03-04 PROCEDURE — 86901 BLOOD TYPING SEROLOGIC RH(D): CPT

## 2024-03-04 PROCEDURE — 59514 CESAREAN DELIVERY ONLY: CPT | Performed by: OBSTETRICS & GYNECOLOGY

## 2024-03-04 PROCEDURE — 99212 OFFICE O/P EST SF 10 MIN: CPT

## 2024-03-04 PROCEDURE — 86780 TREPONEMA PALLIDUM: CPT

## 2024-03-04 PROCEDURE — 4A1HXCZ MONITORING OF PRODUCTS OF CONCEPTION, CARDIAC RATE, EXTERNAL APPROACH: ICD-10-PCS | Performed by: OBSTETRICS & GYNECOLOGY

## 2024-03-04 PROCEDURE — 86900 BLOOD TYPING SEROLOGIC ABO: CPT

## 2024-03-04 PROCEDURE — 82805 BLOOD GASES W/O2 SATURATION: CPT | Performed by: OBSTETRICS & GYNECOLOGY

## 2024-03-04 PROCEDURE — 0UH90HZ INSERTION OF CONTRACEPTIVE DEVICE INTO UTERUS, OPEN APPROACH: ICD-10-PCS | Performed by: OBSTETRICS & GYNECOLOGY

## 2024-03-04 RX ORDER — OXYCODONE HYDROCHLORIDE 5 MG/1
5 TABLET ORAL EVERY 4 HOURS PRN
Status: ACTIVE | OUTPATIENT
Start: 2024-03-04 | End: 2024-03-05

## 2024-03-04 RX ORDER — NALBUPHINE HYDROCHLORIDE 10 MG/ML
5 INJECTION, SOLUTION INTRAMUSCULAR; INTRAVENOUS; SUBCUTANEOUS
Status: ACTIVE | OUTPATIENT
Start: 2024-03-04 | End: 2024-03-05

## 2024-03-04 RX ORDER — MORPHINE SULFATE 0.5 MG/ML
INJECTION, SOLUTION EPIDURAL; INTRATHECAL; INTRAVENOUS
Status: COMPLETED
Start: 2024-03-04 | End: 2024-03-04

## 2024-03-04 RX ORDER — SODIUM CHLORIDE, SODIUM LACTATE, POTASSIUM CHLORIDE, CALCIUM CHLORIDE 600; 310; 30; 20 MG/100ML; MG/100ML; MG/100ML; MG/100ML
125 INJECTION, SOLUTION INTRAVENOUS CONTINUOUS
Status: DISCONTINUED | OUTPATIENT
Start: 2024-03-04 | End: 2024-03-04

## 2024-03-04 RX ORDER — KETOROLAC TROMETHAMINE 30 MG/ML
INJECTION, SOLUTION INTRAMUSCULAR; INTRAVENOUS AS NEEDED
Status: DISCONTINUED | OUTPATIENT
Start: 2024-03-04 | End: 2024-03-04

## 2024-03-04 RX ORDER — OXYTOCIN/RINGER'S LACTATE 30/500 ML
PLASTIC BAG, INJECTION (ML) INTRAVENOUS
Status: COMPLETED
Start: 2024-03-04 | End: 2024-03-04

## 2024-03-04 RX ORDER — BUPIVACAINE HYDROCHLORIDE 2.5 MG/ML
30 INJECTION, SOLUTION EPIDURAL; INFILTRATION; INTRACAUDAL ONCE AS NEEDED
Status: DISCONTINUED | OUTPATIENT
Start: 2024-03-04 | End: 2024-03-04

## 2024-03-04 RX ORDER — ACETAMINOPHEN 325 MG/1
975 TABLET ORAL EVERY 6 HOURS PRN
Status: DISCONTINUED | OUTPATIENT
Start: 2024-03-04 | End: 2024-03-04

## 2024-03-04 RX ORDER — ENOXAPARIN SODIUM 100 MG/ML
40 INJECTION SUBCUTANEOUS
Status: DISCONTINUED | OUTPATIENT
Start: 2024-03-05 | End: 2024-03-06 | Stop reason: HOSPADM

## 2024-03-04 RX ORDER — ACETAMINOPHEN 325 MG/1
650 TABLET ORAL EVERY 6 HOURS
Status: DISCONTINUED | OUTPATIENT
Start: 2024-03-04 | End: 2024-03-04

## 2024-03-04 RX ORDER — KETOROLAC TROMETHAMINE 30 MG/ML
15 INJECTION, SOLUTION INTRAMUSCULAR; INTRAVENOUS EVERY 6 HOURS
Status: DISCONTINUED | OUTPATIENT
Start: 2024-03-04 | End: 2024-03-04 | Stop reason: SDUPTHER

## 2024-03-04 RX ORDER — OXYTOCIN/RINGER'S LACTATE 30/500 ML
62.5 PLASTIC BAG, INJECTION (ML) INTRAVENOUS ONCE
Status: COMPLETED | OUTPATIENT
Start: 2024-03-04 | End: 2024-03-04

## 2024-03-04 RX ORDER — KETOROLAC TROMETHAMINE 30 MG/ML
30 INJECTION, SOLUTION INTRAMUSCULAR; INTRAVENOUS EVERY 6 HOURS
Status: COMPLETED | OUTPATIENT
Start: 2024-03-05 | End: 2024-03-06

## 2024-03-04 RX ORDER — ONDANSETRON 2 MG/ML
INJECTION INTRAMUSCULAR; INTRAVENOUS AS NEEDED
Status: DISCONTINUED | OUTPATIENT
Start: 2024-03-04 | End: 2024-03-04

## 2024-03-04 RX ORDER — ONDANSETRON 2 MG/ML
4 INJECTION INTRAMUSCULAR; INTRAVENOUS ONCE AS NEEDED
Status: DISCONTINUED | OUTPATIENT
Start: 2024-03-04 | End: 2024-03-04 | Stop reason: SDUPTHER

## 2024-03-04 RX ORDER — KETOROLAC TROMETHAMINE 30 MG/ML
INJECTION, SOLUTION INTRAMUSCULAR; INTRAVENOUS
Status: COMPLETED
Start: 2024-03-04 | End: 2024-03-05

## 2024-03-04 RX ORDER — LIDOCAINE HCL/EPINEPHRINE/PF 2%-1:200K
VIAL (ML) INJECTION
Status: COMPLETED
Start: 2024-03-04 | End: 2024-03-04

## 2024-03-04 RX ORDER — LIDOCAINE HYDROCHLORIDE AND EPINEPHRINE 15; 5 MG/ML; UG/ML
INJECTION, SOLUTION EPIDURAL
Status: COMPLETED | OUTPATIENT
Start: 2024-03-04 | End: 2024-03-04

## 2024-03-04 RX ORDER — MORPHINE SULFATE 0.5 MG/ML
INJECTION, SOLUTION EPIDURAL; INTRATHECAL; INTRAVENOUS AS NEEDED
Status: DISCONTINUED | OUTPATIENT
Start: 2024-03-04 | End: 2024-03-04

## 2024-03-04 RX ORDER — OXYTOCIN/RINGER'S LACTATE 30/500 ML
1-30 PLASTIC BAG, INJECTION (ML) INTRAVENOUS
Status: DISCONTINUED | OUTPATIENT
Start: 2024-03-04 | End: 2024-03-04

## 2024-03-04 RX ORDER — CALCIUM CARBONATE 500 MG/1
1000 TABLET, CHEWABLE ORAL 3 TIMES DAILY PRN
Status: DISCONTINUED | OUTPATIENT
Start: 2024-03-04 | End: 2024-03-04

## 2024-03-04 RX ORDER — CEFAZOLIN SODIUM 2 G/50ML
2000 SOLUTION INTRAVENOUS EVERY 8 HOURS
Status: DISCONTINUED | OUTPATIENT
Start: 2024-03-04 | End: 2024-03-04

## 2024-03-04 RX ORDER — LIDOCAINE HCL/EPINEPHRINE/PF 2%-1:200K
VIAL (ML) INJECTION AS NEEDED
Status: DISCONTINUED | OUTPATIENT
Start: 2024-03-04 | End: 2024-03-04

## 2024-03-04 RX ORDER — FENTANYL CITRATE 50 UG/ML
INJECTION, SOLUTION INTRAMUSCULAR; INTRAVENOUS
Status: COMPLETED
Start: 2024-03-04 | End: 2024-03-04

## 2024-03-04 RX ORDER — OXYCODONE HYDROCHLORIDE 5 MG/1
10 TABLET ORAL EVERY 4 HOURS PRN
Status: DISCONTINUED | OUTPATIENT
Start: 2024-03-05 | End: 2024-03-06 | Stop reason: HOSPADM

## 2024-03-04 RX ORDER — OXYCODONE HYDROCHLORIDE 5 MG/1
5 TABLET ORAL EVERY 4 HOURS PRN
Status: DISCONTINUED | OUTPATIENT
Start: 2024-03-05 | End: 2024-03-06 | Stop reason: HOSPADM

## 2024-03-04 RX ORDER — SODIUM CHLORIDE, SODIUM LACTATE, POTASSIUM CHLORIDE, CALCIUM CHLORIDE 600; 310; 30; 20 MG/100ML; MG/100ML; MG/100ML; MG/100ML
125 INJECTION, SOLUTION INTRAVENOUS CONTINUOUS
Status: DISCONTINUED | OUTPATIENT
Start: 2024-03-04 | End: 2024-03-06 | Stop reason: HOSPADM

## 2024-03-04 RX ORDER — ROPIVACAINE HYDROCHLORIDE 5 MG/ML
INJECTION, SOLUTION EPIDURAL; INFILTRATION; PERINEURAL AS NEEDED
Status: DISCONTINUED | OUTPATIENT
Start: 2024-03-04 | End: 2024-03-04

## 2024-03-04 RX ORDER — NALOXONE HYDROCHLORIDE 0.4 MG/ML
0.1 INJECTION, SOLUTION INTRAMUSCULAR; INTRAVENOUS; SUBCUTANEOUS
Status: ACTIVE | OUTPATIENT
Start: 2024-03-04 | End: 2024-03-05

## 2024-03-04 RX ORDER — ACETAMINOPHEN 325 MG/1
975 TABLET ORAL EVERY 6 HOURS SCHEDULED
Status: DISCONTINUED | OUTPATIENT
Start: 2024-03-04 | End: 2024-03-04

## 2024-03-04 RX ORDER — ONDANSETRON 2 MG/ML
4 INJECTION INTRAMUSCULAR; INTRAVENOUS EVERY 6 HOURS PRN
Status: DISCONTINUED | OUTPATIENT
Start: 2024-03-04 | End: 2024-03-04

## 2024-03-04 RX ORDER — EPHEDRINE SULFATE 50 MG/ML
INJECTION INTRAVENOUS
Status: DISPENSED
Start: 2024-03-04 | End: 2024-03-05

## 2024-03-04 RX ORDER — DOCUSATE SODIUM 100 MG/1
100 CAPSULE, LIQUID FILLED ORAL 2 TIMES DAILY
Status: DISCONTINUED | OUTPATIENT
Start: 2024-03-04 | End: 2024-03-06 | Stop reason: HOSPADM

## 2024-03-04 RX ORDER — KETOROLAC TROMETHAMINE 30 MG/ML
15 INJECTION, SOLUTION INTRAMUSCULAR; INTRAVENOUS EVERY 6 HOURS PRN
Status: DISCONTINUED | OUTPATIENT
Start: 2024-03-04 | End: 2024-03-04 | Stop reason: SDUPTHER

## 2024-03-04 RX ORDER — FENTANYL CITRATE 50 UG/ML
INJECTION, SOLUTION INTRAMUSCULAR; INTRAVENOUS AS NEEDED
Status: DISCONTINUED | OUTPATIENT
Start: 2024-03-04 | End: 2024-03-04

## 2024-03-04 RX ORDER — CALCIUM CARBONATE 500 MG/1
1000 TABLET, CHEWABLE ORAL DAILY PRN
Status: DISCONTINUED | OUTPATIENT
Start: 2024-03-04 | End: 2024-03-06 | Stop reason: HOSPADM

## 2024-03-04 RX ORDER — ONDANSETRON 2 MG/ML
4 INJECTION INTRAMUSCULAR; INTRAVENOUS EVERY 6 HOURS PRN
Status: DISCONTINUED | OUTPATIENT
Start: 2024-03-04 | End: 2024-03-04 | Stop reason: SDUPTHER

## 2024-03-04 RX ORDER — IBUPROFEN 600 MG/1
600 TABLET ORAL EVERY 6 HOURS
Status: DISCONTINUED | OUTPATIENT
Start: 2024-03-06 | End: 2024-03-06 | Stop reason: HOSPADM

## 2024-03-04 RX ORDER — ACETAMINOPHEN 325 MG/1
975 TABLET ORAL EVERY 6 HOURS
Status: DISCONTINUED | OUTPATIENT
Start: 2024-03-04 | End: 2024-03-06 | Stop reason: HOSPADM

## 2024-03-04 RX ORDER — ONDANSETRON 2 MG/ML
4 INJECTION INTRAMUSCULAR; INTRAVENOUS EVERY 6 HOURS PRN
Status: DISPENSED | OUTPATIENT
Start: 2024-03-04 | End: 2024-03-05

## 2024-03-04 RX ORDER — ROPIVACAINE HYDROCHLORIDE 2 MG/ML
INJECTION, SOLUTION EPIDURAL; INFILTRATION; PERINEURAL CONTINUOUS PRN
Status: DISCONTINUED | OUTPATIENT
Start: 2024-03-04 | End: 2024-03-04

## 2024-03-04 RX ADMIN — Medication 2 MILLI-UNITS/MIN: at 09:00

## 2024-03-04 RX ADMIN — KETOROLAC TROMETHAMINE 30 MG: 30 INJECTION, SOLUTION INTRAMUSCULAR; INTRAVENOUS at 20:38

## 2024-03-04 RX ADMIN — AZITHROMYCIN MONOHYDRATE 500 MG: 500 INJECTION, POWDER, LYOPHILIZED, FOR SOLUTION INTRAVENOUS at 20:02

## 2024-03-04 RX ADMIN — ROPIVACAINE HYDROCHLORIDE 8 ML/HR: 2 INJECTION, SOLUTION EPIDURAL; INFILTRATION; PERINEURAL at 12:54

## 2024-03-04 RX ADMIN — SODIUM CHLORIDE, SODIUM LACTATE, POTASSIUM CHLORIDE, AND CALCIUM CHLORIDE 125 ML/HR: .6; .31; .03; .02 INJECTION, SOLUTION INTRAVENOUS at 14:53

## 2024-03-04 RX ADMIN — SODIUM CHLORIDE, SODIUM LACTATE, POTASSIUM CHLORIDE, AND CALCIUM CHLORIDE: .6; .31; .03; .02 INJECTION, SOLUTION INTRAVENOUS at 20:23

## 2024-03-04 RX ADMIN — LIDOCAINE HYDROCHLORIDE,EPINEPHRINE BITARTRATE 5 ML: 20; .005 INJECTION, SOLUTION EPIDURAL; INFILTRATION; INTRACAUDAL; PERINEURAL at 19:53

## 2024-03-04 RX ADMIN — Medication 62.5 MILLI-UNITS/MIN: at 22:10

## 2024-03-04 RX ADMIN — LIDOCAINE HYDROCHLORIDE,EPINEPHRINE BITARTRATE 5 ML: 20; .005 INJECTION, SOLUTION EPIDURAL; INFILTRATION; INTRACAUDAL; PERINEURAL at 20:06

## 2024-03-04 RX ADMIN — LIDOCAINE HYDROCHLORIDE AND EPINEPHRINE 3 ML: 15; 5 INJECTION, SOLUTION EPIDURAL at 12:54

## 2024-03-04 RX ADMIN — Medication 250 MILLI-UNITS/MIN: at 20:58

## 2024-03-04 RX ADMIN — ACETAMINOPHEN 325MG 975 MG: 325 TABLET ORAL at 23:20

## 2024-03-04 RX ADMIN — ROPIVACAINE HYDROCHLORIDE: 2 INJECTION, SOLUTION EPIDURAL; INFILTRATION at 12:54

## 2024-03-04 RX ADMIN — NOREPINEPHRINE BITARTRATE 10 MCG: 1 INJECTION, SOLUTION, CONCENTRATE INTRAVENOUS at 20:41

## 2024-03-04 RX ADMIN — SODIUM CHLORIDE, SODIUM LACTATE, POTASSIUM CHLORIDE, AND CALCIUM CHLORIDE 125 ML/HR: .6; .31; .03; .02 INJECTION, SOLUTION INTRAVENOUS at 08:56

## 2024-03-04 RX ADMIN — ONDANSETRON 4 MG: 2 INJECTION INTRAMUSCULAR; INTRAVENOUS at 20:05

## 2024-03-04 RX ADMIN — LIDOCAINE HYDROCHLORIDE,EPINEPHRINE BITARTRATE 5 ML: 20; .005 INJECTION, SOLUTION EPIDURAL; INFILTRATION; INTRACAUDAL; PERINEURAL at 20:02

## 2024-03-04 RX ADMIN — MORPHINE SULFATE 3 MG: 0.5 INJECTION, SOLUTION EPIDURAL; INTRATHECAL; INTRAVENOUS at 20:23

## 2024-03-04 RX ADMIN — CEFAZOLIN SODIUM 2000 MG: 2 SOLUTION INTRAVENOUS at 20:06

## 2024-03-04 RX ADMIN — SODIUM CHLORIDE, SODIUM LACTATE, POTASSIUM CHLORIDE, AND CALCIUM CHLORIDE 125 ML/HR: .6; .31; .03; .02 INJECTION, SOLUTION INTRAVENOUS at 12:24

## 2024-03-04 RX ADMIN — LIDOCAINE HYDROCHLORIDE,EPINEPHRINE BITARTRATE 5 ML: 20; .005 INJECTION, SOLUTION EPIDURAL; INFILTRATION; INTRACAUDAL; PERINEURAL at 19:57

## 2024-03-04 RX ADMIN — NOREPINEPHRINE BITARTRATE 5 MCG: 1 INJECTION, SOLUTION, CONCENTRATE INTRAVENOUS at 20:29

## 2024-03-04 RX ADMIN — ROPIVACAINE HYDROCHLORIDE 4 ML: 5 INJECTION, SOLUTION EPIDURAL; INFILTRATION; PERINEURAL at 12:54

## 2024-03-04 RX ADMIN — FENTANYL CITRATE 100 MCG: 0.05 INJECTION, SOLUTION INTRAMUSCULAR; INTRAVENOUS at 20:04

## 2024-03-04 NOTE — OB LABOR/OXYTOCIN SAFETY PROGRESS
Oxytocin Safety Progress Check Note - Sandra Fishman 33 y.o. female MRN: [id number]    Unit/Bed#: -01 Encounter: [id number]    Dose (ihsan-units/min) Oxytocin: 4 ihsan-units/min  Contraction Frequency (minutes):  (1 possible ctx obsevred)  Contraction Intensity: Mild  Uterine Activity Characteristics: Irritability  Cervical Dilation: 2-3        Cervical Effacement: 70  Fetal Station: -2  Baseline Rate (FHR): 130 bpm  Fetal Heart Rate (FHT): 150 BPM  FHR Category: 1               Vital Signs:   Vitals:    03/04/24 1100   BP: 117/66   Pulse: 71   Resp:    Temp:        Notes/comments:   SVE as above, patient more uncomfortable, undecided on epidural. FHT discontinuous as patient was standing up and moving around secondary to discomfort.  Was reactive and category 1. Continue pitocin titration.    Alisha Fraire MD 3/4/2024 11:16 AM

## 2024-03-04 NOTE — TELEPHONE ENCOUNTER
"Reason for Disposition  • [1] Leakage of fluid from vagina AND [2] leakage started < 4 hours ago    Answer Assessment - Initial Assessment Questions  1. ONSET: \"When did the symptoms begin?\"         This morning 0530  2. CONTRACTIONS: \"Describe the contractions that you are having.\" (e.g., duration, frequency, regularity, severity)      I think they are starting now   3. KAREEM: \"What date are you expecting to deliver?\"      3/10/24  4. PARITY: \"Have you had a baby before?\" If Yes, ask: \"How long did the labor last?\"      G1  5. FETAL MOVEMENT: \"Has the baby's movement decreased or changed significantly from normal?\"      Yes   6. OTHER SYMPTOMS: \"Do you have any other symptoms?\" (e.g., leaking fluid from vagina, vaginal bleeding, fever, hand/facial swelling)      Water broke    Protocols used: Pregnancy - Labor-ADULT-AH    "

## 2024-03-04 NOTE — TELEPHONE ENCOUNTER
"Regarding: water break 39 weeks  ----- Message from Gabby Walton sent at 3/4/2024  5:55 AM EST -----   \" I am 39 weeks and my water just broke.\"    "

## 2024-03-04 NOTE — ASSESSMENT & PLAN NOTE
Hx of incarceration during this pregnancy but not currently   Consider Case management consult postpartum

## 2024-03-04 NOTE — ASSESSMENT & PLAN NOTE
Grossly rupture of speculum exam with pooling of clear fluid , nitrazine positive  Admit to L&D  Clear liquid diet   IV fluids  Labs: CBC, RPR Type and Screen   Analgesia : at maternal request   Management: expectant , patient open to augmentation if unchanged at next check

## 2024-03-04 NOTE — OB LABOR/OXYTOCIN SAFETY PROGRESS
Oxytocin Safety Progress Check Note - Sandra Fishman 33 y.o. female MRN: [id number]    Unit/Bed#: -01 Encounter: [id number]    Dose (ihsan-units/min) Oxytocin: 0 ihsan-units/min  Contraction Frequency (minutes): 8  Contraction Intensity: Mild/Moderate  Uterine Activity Characteristics: Irritability  Cervical Dilation: 4        Cervical Effacement: 80  Fetal Station: -2  Baseline Rate (FHR): 120 bpm  Fetal Heart Rate (FHT): 135 BPM  FHR Category: 2               Vital Signs:   Vitals:    03/04/24 1506   BP: 104/51   Pulse:    Resp:    Temp:    SpO2:        Notes/comments:   SVE as above, some cervical change made. FHT with occasional late decelerations, but otherwise moderate variability and accelerations. Will not re-start pitocin at this time. Will attempt to re-start pitocin if unchanged at next check.     Alisha Fraire MD 3/4/2024 3:28 PM

## 2024-03-04 NOTE — OB LABOR/OXYTOCIN SAFETY PROGRESS
Oxytocin Safety Progress Check Note - Sandra Fishman 33 y.o. female MRN: [id number]    Unit/Bed#: -01 Encounter: [id number]    Dose (ihsan-units/min) Oxytocin: 4 ihsan-units/min  Contraction Frequency (minutes): 4  Contraction Intensity: Mild/Moderate  Uterine Activity Characteristics: Irritability  Cervical Dilation: 3        Cervical Effacement: 80  Fetal Station: -2  Baseline Rate (FHR): 130 bpm  Fetal Heart Rate (FHT): 135 BPM  FHR Category: 2               Vital Signs:   Vitals:    03/04/24 1338   BP: 112/66   Pulse: 71   Resp:    Temp:    SpO2:        Notes/comments:   Patient comfortable with epidural. SVE as above, making cervical change. Will place calixto catheter. FHT with rare late deceleration, otherwise moderate variability and 15x15 accelerations. Continue pitocin titration     Alisha Fraire MD 3/4/2024 1:40 PM

## 2024-03-04 NOTE — PLAN OF CARE
Problem: ANTEPARTUM  Goal: Maintain pregnancy as long as maternal and/or fetal condition is stable  Description: INTERVENTIONS:  - Maternal surveillance  - Fetal surveillance  - Monitor uterine activity  - Medications as ordered  - Bedrest  Outcome: Progressing     Problem: BIRTH - VAGINAL/ SECTION  Goal: Fetal and maternal status remain reassuring during the birth process  Description: INTERVENTIONS:  - Monitor vital signs  - Monitor fetal heart rate  - Monitor uterine activity  - Monitor labor progression (vaginal delivery)  - DVT prophylaxis  - Antibiotic prophylaxis  Outcome: Progressing  Goal: Emotionally satisfying birthing experience for mother/fetus  Description: Interventions:  - Assess, plan, implement and evaluate the nursing care given to the patient in labor  - Advocate the philosophy that each childbirth experience is a unique experience and support the family's chosen level of involvement and control during the labor process   - Actively participate in both the patient's and family's teaching of the birth process  - Consider cultural, Protestant and age-specific factors and plan care for the patient in labor  Outcome: Progressing     Problem: PAIN - ADULT  Goal: Verbalizes/displays adequate comfort level or baseline comfort level  Description: Interventions:  - Encourage patient to monitor pain and request assistance  - Assess pain using appropriate pain scale  - Administer analgesics based on type and severity of pain and evaluate response  - Implement non-pharmacological measures as appropriate and evaluate response  - Consider cultural and social influences on pain and pain management  - Notify physician/advanced practitioner if interventions unsuccessful or patient reports new pain  Outcome: Progressing     Problem: INFECTION - ADULT  Goal: Absence or prevention of progression during hospitalization  Description: INTERVENTIONS:  - Assess and monitor for signs and symptoms of infection  -  Monitor lab/diagnostic results  - Monitor all insertion sites, i.e. indwelling lines, tubes, and drains  - Monitor endotracheal if appropriate and nasal secretions for changes in amount and color  - [place] appropriate cooling/warming therapies per order  - Administer medications as ordered  - Instruct and encourage patient and family to use good hand hygiene technique  - Identify and instruct in appropriate isolation precautions for identified infection/condition  Outcome: Progressing  Goal: Absence of fever/infection during neutropenic period  Description: INTERVENTIONS:  - Monitor WBC    Outcome: Progressing     Problem: SAFETY ADULT  Goal: Patient will remain free of falls  Description: INTERVENTIONS:  - Educate patient/family on patient safety including physical limitations  - Instruct patient to call for assistance with activity   - Consult OT/PT to assist with strengthening/mobility   - Keep Call bell within reach  - Keep bed low and locked with side rails adjusted as appropriate  - Keep care items and personal belongings within reach  - Initiate and maintain comfort rounds  - Make Fall Risk Sign visible to staff  - Offer Toileting every  Hours, in advance of need  - Initiate/Maintain alarm  - Obtain necessary fall risk management equipment:   - Apply yellow socks and bracelet for high fall risk patients  - Consider moving patient to room near nurses station  Outcome: Progressing  Goal: Maintain or return to baseline ADL function  Description: INTERVENTIONS:  -  Assess patient's ability to carry out ADLs; assess patient's baseline for ADL function and identify physical deficits which impact ability to perform ADLs (bathing, care of mouth/teeth, toileting, grooming, dressing, etc.)  - Assess/evaluate cause of self-care deficits   - Assess range of motion  - Assess patient's mobility; develop plan if impaired  - Assess patient's need for assistive devices and provide as appropriate  - Encourage maximum  independence but intervene and supervise when necessary  - Involve family in performance of ADLs  - Assess for home care needs following discharge   - Consider OT consult to assist with ADL evaluation and planning for discharge  - Provide patient education as appropriate  Outcome: Progressing  Goal: Maintains/Returns to pre admission functional level  Description: INTERVENTIONS:  - Perform AM-PAC 6 Click Basic Mobility/ Daily Activity assessment daily.  - Set and communicate daily mobility goal to care team and patient/family/caregiver.   - Collaborate with rehabilitation services on mobility goals if consulted  - Perform Range of Motion  times a day.  - Reposition patient every  hours.  - Dangle patient  times a day  - Stand patient  times a day  - Ambulate patient  times a day  - Out of bed to chair  times a day   - Out of bed for meals  times a day  - Out of bed for toileting  - Record patient progress and toleration of activity level   Outcome: Progressing     Problem: Knowledge Deficit  Goal: Patient/family/caregiver demonstrates understanding of disease process, treatment plan, medications, and discharge instructions  Description: Complete learning assessment and assess knowledge base.  Interventions:  - Provide teaching at level of understanding  - Provide teaching via preferred learning methods  Outcome: Progressing

## 2024-03-04 NOTE — ANESTHESIA PREPROCEDURE EVALUATION
Procedure:  LABOR ANALGESIA    Relevant Problems   GYN   (+) 38 weeks gestation of pregnancy      HEMATOLOGY   (+) Anemia (Hgb 10)      PULMONARY   (+) Asthma affecting pregnancy in third trimester      Other   (+) Hx of Incarceration during pregnancy   (+) Premature rupture of membranes        Physical Exam    Airway    Mallampati score: II         Dental   No notable dental hx     Cardiovascular  Cardiovascular exam normal    Pulmonary  Pulmonary exam normal     Other Findings  post-pubertal.      Anesthesia Plan  ASA Score- 2     Anesthesia Type- epidural with ASA Monitors.         Additional Monitors:     Airway Plan:            Plan Factors-Exercise tolerance (METS): >4 METS.    Chart reviewed.   Existing labs reviewed.     Patient is not a current smoker.              Induction-     Postoperative Plan-     Informed Consent- Anesthetic plan and risks discussed with patient.

## 2024-03-04 NOTE — ANESTHESIA PROCEDURE NOTES
Epidural Block    Patient location during procedure: OB/L&D  Start time: 3/4/2024 12:51 PM  Reason for block: primary anesthetic  Staffing  Performed by: Lauren Burroughs MD  Authorized by: Lauren Burroughs MD    Preanesthetic Checklist  Completed: patient identified, IV checked, site marked, risks and benefits discussed, surgical consent, monitors and equipment checked, pre-op evaluation and timeout performed  Epidural  Patient position: sitting  Prep: Betadine  Sedation Level: no sedation  Patient monitoring: continuous pulse oximetry, frequent blood pressure checks and heart rate  Approach: midline  Location: lumbar, L3-4  Injection technique: ILIR saline  Needle  Needle type: Tuohy   Needle gauge: 18 G  Needle insertion depth: 7 cm  Catheter type: multi-orifice  Catheter size: 20 G  Catheter at skin depth: 12 cm  Catheter securement method: clear occlusive dressing  Test dose: negativelidocaine-epinephrine (XYLOCAINE-MPF/EPINEPHRINE) 1.5 %-1:200,000 injection 3 mL - Epidural   3 mL - 3/4/2024 12:54:00 PM  Assessment  Sensory level: T10  Number of attempts: 1negative aspiration for CSF, negative aspiration for heme and no paresthesia on injection  patient tolerated the procedure well with no immediate complications

## 2024-03-04 NOTE — OB LABOR/OXYTOCIN SAFETY PROGRESS
Labor Progress Note - Sandra Fishman 33 y.o. female MRN: [id number]    Unit/Bed#: -01 Encounter: [id number]    Dose (ihsan-units/min) Oxytocin: 0 ihsan-units/min  Contraction Frequency (minutes): 1.5-4.5  Contraction Intensity: Mild/Moderate  Uterine Activity Characteristics: Irritability  Cervical Dilation: 5        Cervical Effacement: 80  Fetal Station: -2  Baseline Rate (FHR): 120 bpm  Fetal Heart Rate (FHT): 135 BPM  FHR Category: 2               Vital Signs:   Vitals:    24 1710   BP: 102/64   Pulse: 89   Resp:    Temp:    SpO2:        Notes/comments:   SVE as above, some cervical change noted. FHT with 2 min, prolonged deceleration to 80 bpm, resolved with repositioning. Pitocin still off, will not re-start titration at this time. Discussed with patient that if decelerations persist off of pitocin, will need to proceed with a primary  section if remote from delivery.     Alisha Fraire MD 3/4/2024 5:35 PM

## 2024-03-04 NOTE — OB LABOR/OXYTOCIN SAFETY PROGRESS
Labor Progress Note - Sandra Fishman 33 y.o. female MRN: [id number]    Unit/Bed#: -01 Encounter: [id number]       Contraction Frequency (minutes): 0  Contraction Intensity: Mild  Uterine Activity Characteristics: Irritability  Cervical Dilation: 1        Cervical Effacement: 50  Fetal Station: -3  Baseline Rate (FHR): 150 bpm  Fetal Heart Rate (FHT): 150 BPM  FHR Category: 1               Vital Signs:   Vitals:    03/04/24 0807   BP: 109/61   Pulse: 82   Resp:    Temp: (!) 97.4 °F (36.3 °C)       Notes/comments:   SVE unchanged, irregular contractions, FHT category 1. Will start pitocin for induction for PROM.     Alisha Fraire MD 3/4/2024 8:27 AM

## 2024-03-04 NOTE — H&P
H & P- Obstetrics   Sandra Fishman 33 y.o. female MRN: [id number]  Unit/Bed#: -01 Encounter: [id number]    Assessment: 33 y.o.  at 39w1d admitted for premature rupture of membranes.  SVE: 50/-2  FHT: reactive, variable deceleration  Clinical EFW: 20%ile  ; Vertex confirmed by US  GBS status: negative    Postpartum contraception plan: postplacental IUD    Plan:   * Premature rupture of membranes  Assessment & Plan  Grossly rupture of speculum exam with pooling of clear fluid , nitrazine positive  Admit to L&D  Clear liquid diet   IV fluids  Labs: CBC, RPR Type and Screen   Analgesia : at maternal request   Management: expectant , patient open to augmentation if unchanged at next check     Hx of Incarceration during pregnancy  Assessment & Plan  Hx of incarceration during this pregnancy but not currently   Consider Case management consult postpartum     Asthma during pregnancy  Assessment & Plan  Childhood asthma, does not require Albuterol          Discussed case and plan w/ Dr. Heller       Chief Complaint: leaking fluid    HPI: Sandra Fishman is a 33 y.o.  with an KAREEM of 3/10/2024, by Last Menstrual Period at 39w1d who is being admitted for premature ruptue of membranes. She complains of uterine contractions, has light LOF, and reports no VB. She states she has felt good FM..    Patient Active Problem List   Diagnosis    Obstruction of fallopian tube    Female infertility    Seasonal allergies    38 weeks gestation of pregnancy    Asthma during pregnancy    Asthma affecting pregnancy in third trimester    Hx of Incarceration during pregnancy    Premature rupture of membranes       Baby complications/comments: none    Review of Systems   Constitutional:  Negative for chills and fever.   HENT:  Negative for ear pain and sore throat.    Eyes:  Negative for pain and visual disturbance.   Respiratory:  Negative for cough and shortness of breath.    Cardiovascular:  Negative for chest pain and  palpitations.   Gastrointestinal:  Positive for abdominal pain (contractions). Negative for vomiting.   Genitourinary:  Positive for vaginal discharge (leakage of fluid). Negative for dysuria and hematuria.   Musculoskeletal:  Negative for arthralgias and back pain.   Skin:  Negative for color change and rash.   Neurological:  Negative for seizures and syncope.   All other systems reviewed and are negative.      OB Hx:  OB History    Para Term  AB Living   1 0 0 0 0 0   SAB IAB Ectopic Multiple Live Births   0 0 0 0 0      # Outcome Date GA Lbr Rolly/2nd Weight Sex Delivery Anes PTL Lv   1 Current               Obstetric Comments   Menarche: 16      Menses: /regular pad every 3 hours       Past Medical Hx:  Past Medical History:   Diagnosis Date    Anal fissure 2015    Asthma affecting pregnancy in third trimester 1/10/2024    Chlamydia     age 19    Fallopian tube disorder     Fibroadenoma of left breast 2008    Genetic screening     Hgb AA    Melena 2012    Ovarian cyst     Varicella        Past Surgical hx:  Past Surgical History:   Procedure Laterality Date    BREAST SURGERY Left 2008    lesion excision-fibroadenoma    MYOMECTOMY  10/2008    WISDOM TOOTH EXTRACTION         Social Hx:  Alcohol use: not currently   Tobacco use: not currently   Other substance use: not currently     Other: none    No Known Allergies    Medications Prior to Admission   Medication    Prenatal Vit-Fe Fumarate-FA (PRENATAL VITAMINS PO)       Objective:  Temp:  [97.6 °F (36.4 °C)] 97.6 °F (36.4 °C)  HR:  [79] 79  Resp:  [18] 18  BP: (132)/(77) 132/77  There is no height or weight on file to calculate BMI.     Physical Exam:  Chaperone present   Physical Exam  Constitutional:       Appearance: Normal appearance.   Genitourinary:      Vulva normal.      Genitourinary Comments: Speculum exam, pooling of clear fluid, no bleeding    HENT:      Head: Normocephalic and atraumatic.   Eyes:       Extraocular Movements: Extraocular movements intact.   Cardiovascular:      Rate and Rhythm: Normal rate and regular rhythm.      Heart sounds: Normal heart sounds. No murmur heard.     No friction rub. No gallop.   Pulmonary:      Effort: Pulmonary effort is normal. No respiratory distress.      Breath sounds: No wheezing or rhonchi.   Abdominal:      Palpations: Abdomen is soft.      Tenderness: There is no abdominal tenderness. There is no guarding.      Comments: gravid   Musculoskeletal:         General: No swelling or tenderness. Normal range of motion.      Cervical back: Normal range of motion.   Neurological:      Mental Status: She is alert. Mental status is at baseline.   Skin:     General: Skin is warm.      Findings: No rash.   Psychiatric:         Mood and Affect: Mood normal.            FHT:  Baseline Rate (FHR): 130 bpm  Variability: Moderate  Accelerations: 15 x 15 or greater  Decelerations: None    TOCO:   Contraction Frequency (minutes): irritability  Contraction Duration (seconds): irritability  Contraction Intensity: Mild    Lab Results   Component Value Date    WBC 9.16 01/29/2024    HGB 11.2 (L) 01/29/2024    HCT 33.0 (L) 01/29/2024     01/29/2024     Lab Results   Component Value Date    K 4.1 12/18/2019     12/18/2019    CO2 23 12/18/2019    BUN 12 12/18/2019    CREATININE 0.76 12/18/2019    GLUCOSE 88 03/07/2016    AST 10 12/18/2019    ALT 22 12/18/2019     Prenatal Labs: Reviewed      Blood type: B positive   Antibody: negative   GBS: negative   HIV: nonreactive  Rubella: Immune  VDRL/RPR: Non reactive  HBsAg: Negative  Chlamydia: Negative  Gonorrhea: Negative  Diabetes 1 hour screen: 128  3 hour glucose: NA  Platelets: 270  Hgb: 11.2  >2 Midnights  INPATIENT     Signature/Title: Shana Minaya MD  Date: 3/4/2024  Time: 6:52 AM

## 2024-03-05 LAB
ERYTHROCYTE [DISTWIDTH] IN BLOOD BY AUTOMATED COUNT: 13.2 % (ref 11.6–15.1)
HCT VFR BLD AUTO: 30.9 % (ref 34.8–46.1)
HGB BLD-MCNC: 10.2 G/DL (ref 11.5–15.4)
MCH RBC QN AUTO: 30.5 PG (ref 26.8–34.3)
MCHC RBC AUTO-ENTMCNC: 33 G/DL (ref 31.4–37.4)
MCV RBC AUTO: 93 FL (ref 82–98)
PLATELET # BLD AUTO: 229 THOUSANDS/UL (ref 149–390)
PMV BLD AUTO: 12.4 FL (ref 8.9–12.7)
RBC # BLD AUTO: 3.34 MILLION/UL (ref 3.81–5.12)
WBC # BLD AUTO: 13.82 THOUSAND/UL (ref 4.31–10.16)

## 2024-03-05 PROCEDURE — 99024 POSTOP FOLLOW-UP VISIT: CPT | Performed by: OBSTETRICS & GYNECOLOGY

## 2024-03-05 PROCEDURE — 85027 COMPLETE CBC AUTOMATED: CPT

## 2024-03-05 RX ADMIN — ACETAMINOPHEN 325MG 975 MG: 325 TABLET ORAL at 17:09

## 2024-03-05 RX ADMIN — KETOROLAC TROMETHAMINE 30 MG: 30 INJECTION, SOLUTION INTRAMUSCULAR; INTRAVENOUS at 02:19

## 2024-03-05 RX ADMIN — ENOXAPARIN SODIUM 40 MG: 40 INJECTION SUBCUTANEOUS at 08:02

## 2024-03-05 RX ADMIN — KETOROLAC TROMETHAMINE 30 MG: 30 INJECTION, SOLUTION INTRAMUSCULAR; INTRAVENOUS at 07:55

## 2024-03-05 RX ADMIN — DOCUSATE SODIUM 100 MG: 100 CAPSULE, LIQUID FILLED ORAL at 08:02

## 2024-03-05 RX ADMIN — KETOROLAC TROMETHAMINE 30 MG: 30 INJECTION, SOLUTION INTRAMUSCULAR; INTRAVENOUS at 20:54

## 2024-03-05 RX ADMIN — DOCUSATE SODIUM 100 MG: 100 CAPSULE, LIQUID FILLED ORAL at 17:09

## 2024-03-05 RX ADMIN — ACETAMINOPHEN 325MG 975 MG: 325 TABLET ORAL at 05:06

## 2024-03-05 RX ADMIN — SODIUM CHLORIDE, SODIUM LACTATE, POTASSIUM CHLORIDE, AND CALCIUM CHLORIDE 125 ML/HR: .6; .31; .03; .02 INJECTION, SOLUTION INTRAVENOUS at 00:00

## 2024-03-05 RX ADMIN — KETOROLAC TROMETHAMINE 30 MG: 30 INJECTION, SOLUTION INTRAMUSCULAR; INTRAVENOUS at 14:14

## 2024-03-05 RX ADMIN — ACETAMINOPHEN 325MG 975 MG: 325 TABLET ORAL at 11:13

## 2024-03-05 NOTE — OB LABOR/OXYTOCIN SAFETY PROGRESS
Labor Progress Note - Sandra Fishman 33 y.o. female MRN: [id number]    Unit/Bed#: -01 Encounter: [id number]    Dose (ihsan-units/min) Oxytocin: 0 ihsan-units/min  Contraction Frequency (minutes): x1 (ctx not picking up-; toco adjusted)  Contraction Intensity: Mild  Uterine Activity Characteristics: Irritability  Cervical Dilation: 5        Cervical Effacement: 80  Fetal Station: -2  Baseline Rate (FHR): 135 bpm  Fetal Heart Rate (FHT): 135 BPM  FHR Category: CAT II               Vital Signs:   Vitals:    24 1841   BP: 125/78   Pulse: 91   Resp:    Temp:    SpO2:        Notes/comments:   4 minute prolonged late deceleration noted on FHT. Patient repositioned and return to baseline noted. Patient contractions remain irregular and infrequent. Given the persistent CAT II tracing in the setting of protracted cervical dilation  and patient being remote from delivery   section delivery was recommended. She was counseled on risks of  delivery including bleeding, infection, injury to local organs, thormobotic and cardiac events. She expressed understanding and consented to proceeding with  section delivery.     Shana Minaya MD 3/4/2024 7:37 PM

## 2024-03-05 NOTE — PLAN OF CARE
Problem: ANTEPARTUM  Goal: Maintain pregnancy as long as maternal and/or fetal condition is stable  Description: INTERVENTIONS:  - Maternal surveillance  - Fetal surveillance  - Monitor uterine activity  - Medications as ordered  - Bedrest  Outcome: Progressing     Problem: BIRTH - VAGINAL/ SECTION  Goal: Fetal and maternal status remain reassuring during the birth process  Description: INTERVENTIONS:  - Monitor vital signs  - Monitor fetal heart rate  - Monitor uterine activity  - Monitor labor progression (vaginal delivery)  - DVT prophylaxis  - Antibiotic prophylaxis  Outcome: Progressing  Goal: Emotionally satisfying birthing experience for mother/fetus  Description: Interventions:  - Assess, plan, implement and evaluate the nursing care given to the patient in labor  - Advocate the philosophy that each childbirth experience is a unique experience and support the family's chosen level of involvement and control during the labor process   - Actively participate in both the patient's and family's teaching of the birth process  - Consider cultural, Buddhist and age-specific factors and plan care for the patient in labor  Outcome: Progressing     Problem: PAIN - ADULT  Goal: Verbalizes/displays adequate comfort level or baseline comfort level  Description: Interventions:  - Encourage patient to monitor pain and request assistance  - Assess pain using appropriate pain scale  - Administer analgesics based on type and severity of pain and evaluate response  - Implement non-pharmacological measures as appropriate and evaluate response  - Consider cultural and social influences on pain and pain management  - Notify physician/advanced practitioner if interventions unsuccessful or patient reports new pain  Outcome: Progressing     Problem: INFECTION - ADULT  Goal: Absence or prevention of progression during hospitalization  Description: INTERVENTIONS:  - Assess and monitor for signs and symptoms of infection  -  Interval History: doing well  Regained full function of leg    Medications:  Continuous Infusions:   sodium chloride 0.9% 125 mL/hr at 11/07/23 2342     Scheduled Meds:   docusate sodium  100 mg Oral Daily    piperacillin-tazobactam (Zosyn) IV (PEDS and ADULTS) (extended infusion is not appropriate)  3.375 g Intravenous Q8H     PRN Meds:0.9%  NaCl infusion (for blood administration), acetaminophen, hydrALAZINE, HYDROcodone-acetaminophen, melatonin, morphine, ondansetron, sodium chloride 0.9%     Review of patient's allergies indicates:  No Known Allergies  Objective:     Vital Signs (Most Recent):  Temp: 98.5 °F (36.9 °C) (11/08/23 0430)  Pulse: 87 (11/08/23 0430)  Resp: 20 (11/08/23 0808)  BP: (!) 159/79 (11/08/23 0430)  SpO2: 98 % (11/08/23 0430) Vital Signs (24h Range):  Temp:  [98.4 °F (36.9 °C)-98.9 °F (37.2 °C)] 98.5 °F (36.9 °C)  Pulse:  [85-93] 87  Resp:  [16-20] 20  SpO2:  [96 %-100 %] 98 %  BP: (152-171)/(73-81) 159/79     Weight: 81.6 kg (180 lb)  Body mass index is 25.83 kg/m².    Intake/Output - Last 3 Shifts         11/06 0700 11/07 0659 11/07 0700 11/08 0659 11/08 0700 11/09 0659    P.O.       I.V. (mL/kg) 391.7 (4.8)      IV Piggyback 200 100     Total Intake(mL/kg) 591.7 (7.3) 100 (1.2)     Urine (mL/kg/hr) 3675 (1.9) 4525 (2.3)     Drains 0      Total Output 3675 4525     Net -3083.3 -4425            Urine Occurrence 1 x 2 x              Physical Exam  Musculoskeletal:         General: Swelling present.          Significant Labs:  I have reviewed all pertinent lab results within the past 24 hours.  CBC:   Recent Labs   Lab 11/08/23  0521   WBC 12.78*   RBC 3.49*   HGB 9.4*   HCT 28.5*   *   MCV 82   MCH 26.9*   MCHC 33.0     BMP:   Recent Labs   Lab 11/08/23  0521   *      K 5.7*      CO2 28   BUN 14   CREATININE 1.1   CALCIUM 10.1       Significant Diagnostics:  I have reviewed all pertinent imaging results/findings within the past 24 hours.   Monitor lab/diagnostic results  - Monitor all insertion sites, i.e. indwelling lines, tubes, and drains  - Monitor endotracheal if appropriate and nasal secretions for changes in amount and color  - [place] appropriate cooling/warming therapies per order  - Administer medications as ordered  - Instruct and encourage patient and family to use good hand hygiene technique  - Identify and instruct in appropriate isolation precautions for identified infection/condition  Outcome: Progressing  Goal: Absence of fever/infection during neutropenic period  Description: INTERVENTIONS:  - Monitor WBC    Outcome: Progressing     Problem: SAFETY ADULT  Goal: Patient will remain free of falls  Description: INTERVENTIONS:  - Educate patient/family on patient safety including physical limitations  - Instruct patient to call for assistance with activity   - Consult OT/PT to assist with strengthening/mobility   - Keep Call bell within reach  - Keep bed low and locked with side rails adjusted as appropriate  - Keep care items and personal belongings within reach  - Initiate and maintain comfort rounds  - Make Fall Risk Sign visible to staff  -- Apply yellow socks and bracelet for high fall risk patients  - Consider moving patient to room near nurses station  Outcome: Progressing  Goal: Maintain or return to baseline ADL function  Description: INTERVENTIONS:  -  Assess patient's ability to carry out ADLs; assess patient's baseline for ADL function and identify physical deficits which impact ability to perform ADLs (bathing, care of mouth/teeth, toileting, grooming, dressing, etc.)  - Assess/evaluate cause of self-care deficits   - Assess range of motion  - Assess patient's mobility; develop plan if impaired  - Assess patient's need for assistive devices and provide as appropriate  - Encourage maximum independence but intervene and supervise when necessary  - Involve family in performance of ADLs  - Assess for home care needs following  discharge   - Consider OT consult to assist with ADL evaluation and planning for discharge  - Provide patient education as appropriate  Outcome: Progressing  Goal: Maintains/Returns to pre admission functional level  Description: INTERVENTIONS:  - Perform AM-PAC 6 Click Basic Mobility/ Daily Activity assessment daily.  - Set and communicate daily mobility goal to care team and patient/family/caregiver.   - Collaborate with rehabilitation services on mobility goals if consulted  - - Out of bed for toileting  - Record patient progress and toleration of activity level   Outcome: Progressing     Problem: Knowledge Deficit  Goal: Patient/family/caregiver demonstrates understanding of disease process, treatment plan, medications, and discharge instructions  Description: Complete learning assessment and assess knowledge base.  Interventions:  - Provide teaching at level of understanding  - Provide teaching via preferred learning methods  Outcome: Progressing     Problem: DISCHARGE PLANNING  Goal: Discharge to home or other facility with appropriate resources  Description: INTERVENTIONS:  - Identify barriers to discharge w/patient and caregiver  - Arrange for needed discharge resources and transportation as appropriate  - Identify discharge learning needs (meds, wound care, etc.)  - Arrange for interpretive services to assist at discharge as needed  - Refer to Case Management Department for coordinating discharge planning if the patient needs post-hospital services based on physician/advanced practitioner order or complex needs related to functional status, cognitive ability, or social support system  Outcome: Progressing

## 2024-03-05 NOTE — OP NOTE
OPERATIVE REPORT  PATIENT NAME: Sandra Fishman    :  1990  MRN: [id number]  Pt Location: [facility] L[facility] OR ROOM 01    SURGERY DATE: 3/4/2024    Surgeons and Role:     * Alisha Fraire MD - Primary     * Shana Minaya MD - Assisting    Preop Diagnosis:  Pregnancy 39w1d  Premature rupture of membranes  History of incarceration in pregnancy  Anemia    Procedure(s) (LRB):   SECTION () (N/A)    Specimen(s):  ID Type Source Tests Collected by Time Destination   A :  Tissue (Placenta on Hold) OB Only Placenta PLACENTA IN STORAGE Alisha Fraire MD 3/4/2024 2006    B :  Cord Blood Cord BLOOD GAS, VENOUS, CORD, BLOOD GAS, ARTERIAL, CORD Alisha Fraire MD 3/4/2024 2008        Surgical QBL:  Surgical QBL (mL): 422 mL      Drains:  Urethral Catheter Non-latex 16 Fr. (Active)   Output (mL) 650 mL 24 1830   Number of days: 0       Anesthesia Type:   Epidural    Operative Indications:  Persistent category 2 FHT remote from delivery       Aj Group Classification System:  No Multiple pregnancy, No Transverse or oblique lie, No Breech lie, Gestational age is > or =37 weeks, Nulliparous, Labor induced +  is AJ GROUP 2a    Complications:   None    Procedure and Technique:  Operative Findings:  1. Viable male  at 2019 with APGARs of 9 and 9 at 1 and 5 minutes. Fetus weighted 6lb 9.5 oz (2990g). A small bruise of unclear etiology was noted on the 's left cheek at the time of delivery.  2. Normal intact placenta with centrally inserted 3VC expressed at .   3. Normal uterus, bilateral tubes and ovaries.  4. Blood gases:   Arterial pH: 7.263   Arterial base excess: -4.0   Venous pH: 7.353   Venous base excess: -3.1    Patient is a 32 yo  at 39w1d who was admitted with prelabor rupture of membranes. She was started on pitocin titration for induction, and received an epidural for analgesia. She was noted to have an intermittently category 2 FHT with intermittent late  decelerations during her labor course, and pitocin was discontinued. The FHT continued to have late decelerations without pitocin, and the decelerations became more frequent and significant. Patient did not make cervical change beyond 5 cm after pitocin was discontinued, and the pitocin could not be re-started due to the category 2 FHT. Due to a persistently category 2 FHT remote from delivery, the decision was made to proceed with a primary  delivery.    The patient was taken to the operating room. Epidural anesthesia was adequately established and Ancef and Azithromycin were given for preoperative prophylaxis.  The patient was then placed in the dorsal supine position with a left tilt of the hips. The patient was then prepped with chlorhexidine for vaginal prep and chloraprep for abdominal prep and draped in the usual sterile fashion for a Pfannenstiel skin incision.      A time out was performed to confirm correct patient and correct procedure. An incision was made in the skin with a surgical scalpel and sharp dissection was carried out over subsequent layers of tissue including the fascia, followed by the Bovie electrocautery for hemostasis.  The fascia was incised at the midline and the fascial incision was extended bilaterally using the curved Anglin scissors.      The superior edge of the fascial incision was grasped with Kocher clamps, tented up and the underlying rectus muscles were dissected off bluntly and sharply using the scalpel. The rectus muscles were then divided at midline and the peritoneum was identified, tented up at its upper margin taking care to avoid the bladder, and then entered.  The peritoneal incision was extended superiorly and inferiorly.  An Bridger retractor was inserted and a transverse incision was made in the lower uterine segment using a new surgical blade.  The uterine incision was extended cephalad and caudal using blunt dissection.  The amniotic sac was entered and the  amniotic fluid was noted to be clear.    The surgeon's hand was placed into the uterine cavity. The fetal head was identified and elevated through the uterine incision with the assistance of fundal pressure. With gentle traction, the shoulder was delivered, followed by the rest of the fetal body. A loose nuchal cord was wrapped around the shoulder and reduced. On delivery the cord was doubly clamped and cut after delayed cord clamping.  The infant was then passed off the table to the awaiting  staff. The  was noted to cry spontaneously and moved all extremities. Venous and arterial blood gas, cord blood, and portion of cord was obtained for analysis and routine blood testing.  The placenta delivery was then sent to storage. Placenta was noted to be intact with a centrally inserted three-vessel cord.  Oxytocin was administered by IV infusion to enhance uterine contraction.  The uterus was cleared of all clots and remaining products of conception.      The uterine incision was re approximated using a 0 Vicryl in a running locked fashion.  A second horizontal imbricating stitch with 0 Monocryl was applied.  The uterine incision was examined and noted to be hemostatic. The uterine incision was once again reexamined and noted to be hemostatic.  The fascia was re approximated using 0 Vicryl in a running nonlocked fashion. The subcutaneous tissue was irrigated and cleared of all clots and debris.  Good hemostasis was noted with Bovie electrocautery. The subcutaneous  tissue was reapproximated with running plain suture.  The skin incision was closed using 4-0 Monocryl with Steri Strips placed over top.  Good hemostasis was noted.  Patient tolerated the procedure well.  All needle, sponge, and instrument counts were noted to be correct x 2 at the end of the procedure.  Patient was transferred to the recovery room in stable condition.      I was present for the entire procedure.    Patient Disposition:  PACU          SIGNATURE: Alisha Fraire MD  DATE: March 4, 2024  TIME: 9:00 PM

## 2024-03-05 NOTE — LACTATION NOTE
This note was copied from a baby's chart.  CONSULT - LACTATION  Baby Boy (Jake Fishman 1 days male MRN: [id number]    [facility] NURSERY Room / Bed: (N)/(N) Encounter: [id number]    Maternal Information     MOTHER:  Sandra Fishman  Maternal Age: 33 y.o.   OB History: # 1 - Date: 24, Sex: Male, Weight: 2990 g (6 lb 9.5 oz), GA: 39w1d, Delivery: , Low Transverse, Apgar1: 9, Apgar5: 9, Living: Living, Birth Comments: None   Previouse breast reduction surgery? No    Lactation history:   Has patient previously breast fed: No   How long had patient previously breast fed:     Previous breast feeding complications:       Past Surgical History:   Procedure Laterality Date    BREAST SURGERY Left 2008    lesion excision-fibroadenoma    MYOMECTOMY  10/2008    WISDOM TOOTH EXTRACTION          Birth information:  YOB: 2022   Time of birth: 8:19 PM   Sex: male   Delivery type: , Low Transverse   Birth Weight: 2990 g (6 lb 9.5 oz)   Percent of Weight Change: 0%     Gestational Age: 39w1d   [unfilled]    Assessment     Breast and nipple assessment: normal assessment    [place] Assessment: normal assessment    Feeding assessment: feeding well  LATCH:  Latch: Repeated attempts, hold nipple in mouth, stimulate to suck   Audible Swallowing: Spontaneous and intermittent (24 hours old)   Type of Nipple: Everted (After stimulation)   Comfort (Breast/Nipple): Soft/non-tender   Hold (Positioning): Partial assist, teach one side, mother does other, staff holds   LATCH Score: 8          Feeding recommendations:  breast feed on demand    Met with Sandra who is breastfeeding her baby boy. She states that baby has been sleepy but she has had a few good feeds. On entering room baby was latched on, but latch appeared shallow. Had Mom break suction and reposition baby to achieve a deeper latch.     Provided mom with the Ready Set Baby and the Discharge  Breastfeeding Booklets and reviewed information.     Discussed Skin to Skin contact and benefits to mom and baby.  Feeding cues and what that means for recognizing infant's hunger reviewed. Avoidance of pacifiers for the first month discussed. Talked about exclusive breastfeeding for the first 6 months.    Positioning and latch reviewed as well as showing images of other feeding positions.  Discussed the properties of a good latch in any position. Reviewed hand/manual expression.    Gave information on common concerns, what to expect the first few weeks after delivery, preparing for other caregivers, and how partners can help. Resources for support also provided.    Also went over the feeding log, emphasized  8 or more (12) feedings in a 24 hour period, what to expect for the number of diapers per day of life and the progression of properties of the  stooling pattern.    Discussed s/s engorgement, blocked milk ducts, and mastitis. Discussed how to remedy at home and when to contact physician.    Breastfeeding and your lifestyle, storage and preparation of breast milk, how to keep your breast pump clean, the employed breastfeeding mother and paced bottle feeding information provided.     Booklet included Breastfeeding Resources for after discharge including access to the [facility] for the [facility] for follow up breastfeeding support as needed.     1. Meet early feeding cues.  2. Bring baby to breast skin to skin.  3  Position baby up at chest level using pillows for support .   4.Baby's ear, shoulder, hip in alignment.  5. Bring baby to breast,not breast to baby ( no hunching over ).  6.Align nose to nipple and drag nipple down to chin to achieve a wide open mouth.   7. Use breast compressions to stimulate suck.    Encouraged Sandra to call if she has additional questions and for a latch assessment as needed.            Penelope Serra RN 3/5/2024 1:58 PM

## 2024-03-05 NOTE — PLAN OF CARE
Problem: ANTEPARTUM  Goal: Maintain pregnancy as long as maternal and/or fetal condition is stable  Description: INTERVENTIONS:  - Maternal surveillance  - Fetal surveillance  - Monitor uterine activity  - Medications as ordered  - Bedrest  Outcome: Progressing     Problem: BIRTH - VAGINAL/ SECTION  Goal: Fetal and maternal status remain reassuring during the birth process  Description: INTERVENTIONS:  - Monitor vital signs  - Monitor fetal heart rate  - Monitor uterine activity  - Monitor labor progression (vaginal delivery)  - DVT prophylaxis  - Antibiotic prophylaxis  Outcome: Progressing  Goal: Emotionally satisfying birthing experience for mother/fetus  Description: Interventions:  - Assess, plan, implement and evaluate the nursing care given to the patient in labor  - Advocate the philosophy that each childbirth experience is a unique experience and support the family's chosen level of involvement and control during the labor process   - Actively participate in both the patient's and family's teaching of the birth process  - Consider cultural, Anabaptism and age-specific factors and plan care for the patient in labor  Outcome: Progressing     Problem: PAIN - ADULT  Goal: Verbalizes/displays adequate comfort level or baseline comfort level  Description: Interventions:  - Encourage patient to monitor pain and request assistance  - Assess pain using appropriate pain scale  - Administer analgesics based on type and severity of pain and evaluate response  - Implement non-pharmacological measures as appropriate and evaluate response  - Consider cultural and social influences on pain and pain management  - Notify physician/advanced practitioner if interventions unsuccessful or patient reports new pain  Outcome: Progressing     Problem: INFECTION - ADULT  Goal: Absence or prevention of progression during hospitalization  Description: INTERVENTIONS:  - Assess and monitor for signs and symptoms of infection  -  Monitor lab/diagnostic results  - Monitor all insertion sites, i.e. indwelling lines, tubes, and drains  - Monitor endotracheal if appropriate and nasal secretions for changes in amount and color  - [place] appropriate cooling/warming therapies per order  - Administer medications as ordered  - Instruct and encourage patient and family to use good hand hygiene technique  - Identify and instruct in appropriate isolation precautions for identified infection/condition  Outcome: Progressing  Goal: Absence of fever/infection during neutropenic period  Description: INTERVENTIONS:  - Monitor WBC    Outcome: Progressing     Problem: SAFETY ADULT  Goal: Patient will remain free of falls  Description: INTERVENTIONS:  - Educate patient/family on patient safety including physical limitations  - Instruct patient to call for assistance with activity   - Consult OT/PT to assist with strengthening/mobility   - Keep Call bell within reach  - Keep bed low and locked with side rails adjusted as appropriate  - Keep care items and personal belongings within reach  - Initiate and maintain comfort rounds  - Make Fall Risk Sign visible to staff  - Offer Toileting every  Hours, in advance of need  - Initiate/Maintain alarm  - Obtain necessary fall risk management equipment:  - Apply yellow socks and bracelet for high fall risk patients  - Consider moving patient to room near nurses station  Outcome: Progressing  Goal: Maintain or return to baseline ADL function  Description: INTERVENTIONS:  -  Assess patient's ability to carry out ADLs; assess patient's baseline for ADL function and identify physical deficits which impact ability to perform ADLs (bathing, care of mouth/teeth, toileting, grooming, dressing, etc.)  - Assess/evaluate cause of self-care deficits   - Assess range of motion  - Assess patient's mobility; develop plan if impaired  - Assess patient's need for assistive devices and provide as appropriate  - Encourage maximum  independence but intervene and supervise when necessary  - Involve family in performance of ADLs  - Assess for home care needs following discharge   - Consider OT consult to assist with ADL evaluation and planning for discharge  - Provide patient education as appropriate  Outcome: Progressing  Goal: Maintains/Returns to pre admission functional level  Description: INTERVENTIONS:  - Perform AM-PAC 6 Click Basic Mobility/ Daily Activity assessment daily.  - Set and communicate daily mobility goal to care team and patient/family/caregiver.   - Collaborate with rehabilitation services on mobility goals if consulted  - Perform Range of Motion  times a day.  - Reposition patient every  hours.  - Dangle patient  times a day  - Stand patient  times a day  - Ambulate patient  times a day  - Out of bed to chair  times a day   - Out of bed for meals  times a day  - Out of bed for toileting  - Record patient progress and toleration of activity level   Outcome: Progressing     Problem: Knowledge Deficit  Goal: Patient/family/caregiver demonstrates understanding of disease process, treatment plan, medications, and discharge instructions  Description: Complete learning assessment and assess knowledge base.  Interventions:  - Provide teaching at level of understanding  - Provide teaching via preferred learning methods  Outcome: Progressing     Problem: DISCHARGE PLANNING  Goal: Discharge to home or other facility with appropriate resources  Description: INTERVENTIONS:  - Identify barriers to discharge w/patient and caregiver  - Arrange for needed discharge resources and transportation as appropriate  - Identify discharge learning needs (meds, wound care, etc.)  - Arrange for interpretive services to assist at discharge as needed  - Refer to Case Management Department for coordinating discharge planning if the patient needs post-hospital services based on physician/advanced practitioner order or complex needs related to functional  status, cognitive ability, or social support system  Outcome: Progressing

## 2024-03-05 NOTE — ASSESSMENT & PLAN NOTE
, Hgb 10.9 --> post op Hgb 10.2  Lines: calixto catheter removed this morning, f/u void trial  Pain: Tylenol and toradol scheduled, ghanshyam 5/10 PRN    FEN: Tolerating regular diet  DVT ppx: SCDs and Lovenox 40mg qD  Passing flatus   Incision C/D/I

## 2024-03-05 NOTE — PROGRESS NOTES
"Progress Note - OB/GYN  Sandra Fishman 33 y.o. female MRN: [id number]  Unit/Bed#: -01 Encounter: [id number]    Assessment and Plan     Sandra Fishman is a patient of: [facility] (Dr. Hand, Dr. Wallace, Dr. Fraire). She is PPD# 1 s/p  primary  section, low transverse incision  Recovering well and is stable       Hx of Incarceration during pregnancy  Assessment & Plan  Hx of incarceration during this pregnancy but not currently   Consider Case management consult postpartum     Asthma during pregnancy  Assessment & Plan  Childhood asthma, does not require Albuterol    * Status post primary low transverse  section  Assessment & Plan  , Hgb 10.9 --> post op Hgb 10.2  Lines: calixto catheter removed this morning, f/u void trial  Pain: Tylenol and toradol scheduled, ghanshyam 5/10 PRN    FEN: Tolerating regular diet  DVT ppx: SCDs and Lovenox 40mg qD  Passing flatus   Incision C/D/I           Disposition    - Anticipate discharge home on PPD# 2-4      Subjective/Objective     Chief Complaint: Postpartum State     Subjective:    Sandra Fishman is POD#1 s/p  primary  section, low transverse incision. She has no current complaints.  Pain is well controlled.  Patient is not currently voiding.  She is not ambulating.  Patient is not currently passing flatus and has had no bowel movement. She is tolerating PO, and denies nausea or vomitting. Patient denies fever, chills, chest pain, shortness of breath, or calf tenderness. Lochia is normal. She is  Breastfeeding. She is recovering well and is stable.       Vitals:   /65 (BP Location: Right arm)   Pulse 80   Temp 97.5 °F (36.4 °C) (Temporal)   Resp 18   Ht 5' 1\" (1.549 m)   Wt 82.1 kg (181 lb)   LMP 2023 (Exact Date)   SpO2 96%   Breastfeeding Yes   BMI 34.20 kg/m²       Intake/Output Summary (Last 24 hours) at 3/5/2024 0617  Last data filed at 3/5/2024 0535  Gross per 24 hour   Intake 1700 ml   Output 1872 ml   Net " -172 ml       Invasive Devices       Peripheral Intravenous Line  Duration             Peripheral IV 03/04/24 Left;Ventral (anterior) Forearm <1 day                    Physical Exam:   GEN: Sandra Fishman appears well, alert and oriented x 3, pleasant and cooperative   CARDIO: RRR, no murmurs or rubs  RESP:  CTAB, no wheezes or rales  ABDOMEN: soft, no tenderness, no distention, fundus @ -1, Incision C/D/I  EXTREMITIES: SCDs on, non tender, no erythema      Labs:     Hemoglobin   Date Value Ref Range Status   03/05/2024 10.2 (L) 11.5 - 15.4 g/dL Final   03/04/2024 10.9 (L) 11.5 - 15.4 g/dL Final     WBC   Date Value Ref Range Status   03/05/2024 13.82 (H) 4.31 - 10.16 Thousand/uL Final   03/04/2024 9.93 4.31 - 10.16 Thousand/uL Final     Platelets   Date Value Ref Range Status   03/05/2024 229 149 - 390 Thousands/uL Final   03/04/2024 237 149 - 390 Thousands/uL Final     Creatinine   Date Value Ref Range Status   12/18/2019 0.76 0.60 - 1.30 mg/dL Final     Comment:     Standardized to IDMS reference method   12/04/2017 0.67 0.60 - 1.30 mg/dL Final     Comment:     Standardized to IDMS reference method     AST   Date Value Ref Range Status   12/18/2019 10 5 - 45 U/L Final     Comment:       Specimen collection should occur prior to Sulfasalazine administration due to the potential for falsely depressed results.    12/04/2017 10 5 - 45 U/L Final     Comment:       Specimen collection should occur prior to Sulfasalazine administration due to the potential for falsely depressed results.      ALT   Date Value Ref Range Status   12/18/2019 22 12 - 78 U/L Final     Comment:       Specimen collection should occur prior to Sulfasalazine and/or Sulfapyridine administration due to the potential for falsely depressed results.    12/04/2017 17 12 - 78 U/L Final     Comment:       Specimen collection should occur prior to Sulfasalazine administration due to the potential for falsely depressed results.           Judith Danielson  MD Manuel  3/5/2024  6:17 AM

## 2024-03-05 NOTE — ANESTHESIA POSTPROCEDURE EVALUATION
"Post-Op Assessment Note    CV Status:  Stable    Pain management: adequate      Post-op block assessment: catheter intact and no complications   Mental Status:  Awake   Hydration Status:  Stable   PONV Controlled:  Controlled   Airway Patency:  Patent     Post Op Vitals Reviewed: Yes    No anethesia notable event occurred.    Staff: Anesthesiologist         /78   Pulse 91   Temp 97.5 °F (36.4 °C)   Resp 18   Ht 5' 1\" (1.549 m)   Wt 82.1 kg (181 lb)   LMP 06/04/2023 (Exact Date)   SpO2 100%   Breastfeeding Unknown   BMI 34.20 kg/m²         BP      Temp      Pulse     Resp      SpO2        "

## 2024-03-05 NOTE — DISCHARGE SUMMARY
Obstetrics Discharge Summary   Sandra Fishman 33 y.o. female MRN: [id number]  Unit/Bed#: LD OR  Encounter: [id number]    Admission Date: 3/4/2024     Discharge Date: 3/6/2024    Admitting Diagnoses:   Pregnancy 39w1d  Premature rupture of membranes  History of incarceration in pregnancy  Anemia    Discharge Diagnoses:   Same, delivered      Procedures:   primary  section, low transverse incision      Admitting Attending: Dr. Heller  Delivery Attending: Dr. Fraire  Discharge Attending: Dr. Rachel    Hospital Course:   Sandra Fishman is now a 33 y.o. G 1 P 1001 who was initially admitted at 39 w 1 d due to premature rupture of membranes.  She was initially managed expectantly however due to minimal cervical change her labor was augmented by Pitocin titration.  She received epidural for analgesia.She was noted to have an intermittently category 2 FHT with intermittent late decelerations during her labor course, and pitocin was discontinued. The FHT continued to have late decelerations without pitocin, and the decelerations became more frequent and significant. Patient did not make cervical change beyond 5 cm after pitocin was discontinued, and the pitocin could not be re-started due to the category 2 FHT. Due to a persistently category 2 FHT remote from delivery, the decision was made to proceed with a primary  delivery.     She underwent an uncomplicated primary low transverse  section delivery on 2024 and delivered a viable male  at 2019. APGARS were 9, 9 at 1 and 5 minutes, respectively. 's birth weight was 6lb 9.5oz. Placenta delivered without difficulty.   was admitted to the  nursery. Patient tolerated the procedure well and was transferred to recovery in stable condition.     The patient's post partum course was unremarkable.. Her preoperative hemoglobin was 10.9g/dL, postoperative was 10.2. Her postoperative pain was well controlled with oral  analgesics.    On day of discharge, she was ambulating and able to reasonably perform all ADLs. She was voiding and had appropriate bowel function. Pain was well controlled. She was discharged home on post-operative day #2 without complications. Patient was instructed to follow up with her OB as an outpatient and was given appropriate warnings to call provider if she develops signs of infection or uncontrolled pain. She is breast feeding .  Mom's blood type is B positive, RhoGAM was not indicated.      Complications:   None    Condition at discharge:   good     Provisions for Follow-Up Care:  See after visit summary for information related to follow-up care and any pertinent home health orders.      Disposition:   Home    Planned Readmission:   No    Discharge Medications:   Please see AVS for a complete list of discharge medications.    Discharge instructions :   Please see AVS for complete discharge instructions.    XU Jurado

## 2024-03-05 NOTE — OB LABOR/OXYTOCIN SAFETY PROGRESS
Labor Progress Note - Sandra Fishman 33 y.o. female MRN: [id number]    Unit/Bed#: -01 Encounter: [id number]    Dose (ihsan-units/min) Oxytocin: 0 ihsan-units/min  Contraction Frequency (minutes): x1 (ctx not picking up-; toco adjusted)  Contraction Intensity: Mild  Uterine Activity Characteristics: Irritability  Cervical Dilation: 5        Cervical Effacement: 80  Fetal Station: -2  Baseline Rate (FHR): 135 bpm  Fetal Heart Rate (FHT): 135 BPM  FHR Category: CAT II               Vital Signs:   Vitals:    24 1841   BP: 125/78   Pulse: 91   Resp:    Temp:    SpO2:        Notes/comments:   Patient feeling more uncomfortable. SVE remains unchanged with pitocin still on hold due to intermittent late decelerations. In the past 20 minutes FHT has had recurrent late decelerations. Contractions are Q 5-6. Plan to continue  expectant management and recheck cervical change in 2 hours or sooner and if she remains unchanged  section delivery may be recommended.       Dr. Fraire aware    Shana Minaya MD 3/4/2024 7:29 PM
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
## 2024-03-05 NOTE — DISCHARGE INSTRUCTIONS
WHAT YOU NEED TO KNOW:   A , or  section, is abdominal surgery to deliver your baby.  DISCHARGE INSTRUCTIONS:   Call 911 for any of the following:   You feel lightheaded, short of breath, and have chest pain.     You cough up blood.  Seek care immediately if:   Blood soaks through your bandage.     Your stitches come apart.     Your arm or leg feels warm, tender, and painful. It may look swollen and red.  Contact your OB if:   You have heavy vaginal bleeding that fills 1 or more sanitary pads in 1 hour.    You have a fever.     Your incision is swollen, red, or draining pus.     You have questions or concerns about yourself or your baby.  Medicines:  You may  need any of the following:  Prescription pain medicine  may be given. Ask how to take this medicine safely.     Acetaminophen  decreases pain and fever. It is available without a doctor's order. Ask how much to take and how often to take it. Follow directions. Acetaminophen can cause liver damage if not taken correctly.    NSAIDs , such as ibuprofen, help decrease swelling, pain, and fever. NSAIDs can cause stomach bleeding or kidney problems in certain people. If you take blood thinner medicine, always ask your healthcare provider if NSAIDs are safe for you. Always read the medicine label and follow directions.    Take your medicine as directed.  Contact your healthcare provider if you think your medicine is not helping or if you have side effects. Tell him or her if you are allergic to any medicine. Keep a list of the medicines, vitamins, and herbs you take. Include the amounts, and when and why you take them. Bring the list or the pill bottles to follow-up visits. Carry your medicine list with you in case of an emergency.  Wound care:  Carefully wash your wound with soap and water every day. Keep your wound clean and dry. Wear loose, comfortable clothes that do not rub against your wound. Ask your OB about bathing and  showering.  Limit activity as directed:   Ask when it is safe for you to drive, walk up stairs, lift heavy objects, and have sex.     Ask when it is okay to exercise, and what types of exercise to do. Start slowly and do more as you get stronger.  Drink liquids as directed:  Liquids help keep you hydrated after your procedure and decrease your risk for a blood clot. Ask how much liquid to drink each day and which liquids are best for you.   Follow up with your OB as directed:  You may need to return to have your stitches or staples removed. Write down your questions so you remember to ask them during your visits.  © 2017 [employer] Information is for End User's use only and may not be sold, redistributed or otherwise used for commercial purposes. All illustrations and images included in CareNotes® are the copyrighted property of [employer]. or [employer].  The above information is an  only. It is not intended as medical advice for individual conditions or treatments. Talk to your doctor, nurse or pharmacist before following any medical regimen to see if it is safe and effective for you.      Postpartum Perineal Care   WHAT YOU NEED TO KNOW:   Postpartum perineal care is care for your perineum after you have a baby. The perineum is your vagina and anus.   DISCHARGE INSTRUCTIONS:   Care for your perineum:  Healthcare providers will give you a small squirt bottle and show you how to use it. Do the following after you use the toilet and before you put on a new pad:  Remove the soiled pad    Use the squirt bottle to rinse your perineum from front to back while you sit on the toilet     Pat the area dry from front to back with toilet paper or a cotton cloth     Put on a fresh pad    Wash your hands  Decrease pain:  Ask your healthcare provider about these and other ways to decrease perineal pain:  Sitz baths:  Healthcare providers may give you a portable sitz bath. This is a  small tub that fits in the toilet. Fill the sitz bath or bathtub with 4 to 6 inches of warm water. Sit in the warm water for 20 minutes 2 to 3 times a day.    Ice:  Ice helps decrease swelling and pain. Ice may also help prevent tissue damage. Use an ice pack, or put crushed ice in a plastic bag. Cover it with a towel and place it on your perineum for 15 to 20 minutes every hour, or as directed.    Medicine spray, wipes, or pads:  Healthcare providers may give you a medicine spray or wipes soaked with numbing medicine to decrease the pain. Pads that contain an herb called witch hazel may also help reduce pain. Use these after perineal care or a sitz bath.  Follow up with your healthcare provider as directed:  Write down your questions so you remember to ask them during your visits.   Contact your healthcare provider if:   You have heavy vaginal bleeding that fills 1 or more sanitary pads in 1 hour.    You have foul-smelling vaginal discharge.    You feel weak or lightheaded.    You have questions or concerns about your condition or care.  Seek care immediately or call 911 if:   You have large blood clots or bright red blood coming from your vagina.    You have abdominal pain, vomiting, and a fever.  © 2017 [employer] Information is for End User's use only and may not be sold, redistributed or otherwise used for commercial purposes. All illustrations and images included in CareNotes® are the copyrighted property of [employer]. or [employer].  The above information is an  only. It is not intended as medical advice for individual conditions or treatments. Talk to your doctor, nurse or pharmacist before following any medical regimen to see if it is safe and effective for you.      Postpartum Depression   WHAT YOU NEED TO KNOW:   What is postpartum depression?  Postpartum depression is a mood disorder that occurs after giving birth. A mood is an emotion or a feeling. Moods  affect your behavior and how you feel about yourself and life in general. Depression is a sad mood that you cannot control. Women often feel sad, afraid, or nervous after their baby is born. These feelings are called postpartum blues or baby blues, and they usually go away in 1 to 2 weeks. With postpartum depression, these symptoms get worse and continue for more than 2 weeks. Postpartum depression is a serious condition that affects your daily activities and relationships.   What causes postpartum depression?  Healthcare providers do not know exactly what causes postpartum depression. It may be caused by a sudden drop in hormone levels after childbirth. A previous episode of postpartum depression or a family history of depression may increase your risk. Several things may trigger postpartum depression:  Lack of support from the baby's father or other family members    Feeling more tired than usual    Stress, a poor diet, or lack of sleep    Pain after childbirth or pain during breastfeeding    Sudden change in lifestyle  How is postpartum depression diagnosed?  Postpartum depression affects your daily activities and your relationships with other people. Healthcare providers will ask you questions about your signs and symptoms and how they are affecting your life. The symptoms of postpartum depression usually begin within 1 month after childbirth. You feel depressed or lose interest in activities you enjoy nearly every day for at least 2 weeks. You also have 4 or more of the following symptoms:  You feel tired or have less energy than usual.     You feel unimportant or guilty most of the time.    You think about hurting or killing yourself.    Your appetite changes. You may lose your appetite and lose weight without trying. Your appetite may also increase and you may gain weight.    You are restless, irritable, or withdrawn.    You have trouble concentrating and remembering things. You have trouble doing daily tasks  or making decisions.    You have trouble sleeping, even after the baby is asleep.  How is postpartum depression treated?   Psychotherapy:  During therapy, you will talk with healthcare providers about how to cope with your feelings and moods. This can be done alone or in a group. It may also be done with family members or your partner.     Antidepressants:  This medicine is given to decrease or stop the symptoms of depression. You usually need to take antidepressants for several weeks before you begin to feel better. Do not stop taking antidepressants unless your healthcare provider tells you to. Healthcare providers may try a different antidepressant if one type does not work.  What can I do to feel better?   Rest:  Do not try to do everything all at the same time. Do only what is needed and let other things wait until later. Ask your family or friends for help, especially if you have other children. Ask your partner to help with night feedings or other baby care. Try to sleep when the baby naps.     Get emotional support:  Share your feelings with your partner, a friend, or another mother.     Take care of yourself:  Shower and dress each day. Do not skip meals. Try to get out of the house a little each day. Get regular exercise. Eat a healthy diet. Avoid alcohol because it can make your depression worse. Do not isolate yourself. Go for a walk or meet with a friend. It is also important that you have some time by yourself each day.  How do I find support and more information?   [employer] [place] of Mental Health ([facility]), Public Information & Communication Branch  [address] [place] [address], Room [address], [facility] [address]  [place] [address]   Phone: [phone]  Phone: [phone]  Web Address: http://www.[facility].nih.gov/  When should I contact my healthcare provider?   You cannot make it to your next visit.    Your depression does not get better with treatment or it gets worse.     You have questions or  concerns about your condition or care.  When should I seek immediate care or call 911?   You think about hurting or killing yourself, your baby, or someone else.    You feel like other people want to hurt you.     You hear voices telling you to hurt yourself or your baby.  CARE AGREEMENT:   You have the right to help plan your care. Learn about your health condition and how it may be treated. Discuss treatment options with your caregivers to decide what care you want to receive. You always have the right to refuse treatment. The above information is an  only. It is not intended as medical advice for individual conditions or treatments. Talk to your doctor, nurse or pharmacist before following any medical regimen to see if it is safe and effective for you.  ©  [employer] Information is for End User's use only and may not be sold, redistributed or otherwise used for commercial purposes. All illustrations and images included in CareNotes® are the copyrighted property of [employer]. or [employer].      Postpartum Bleeding   WHAT YOU NEED TO KNOW:   Postpartum bleeding is vaginal bleeding after childbirth. This bleeding is normal, whether your baby was born vaginally or by . It contains blood and the tissue that lined the inside of your uterus when you were pregnant.   DISCHARGE INSTRUCTIONS:   What to expect with postpartum bleeding:  Postpartum bleeding usually lasts at least 10 days, and may last longer than 6 weeks. Your bleeding may range from light (barely staining a pad) to heavy (soaking a pad in 1 hour). Usually, you have heavier bleeding right after childbirth, which slows over the next few weeks until it stops. The bleeding is red or dark brown with clots for the first 1 to 3 days. It then turns pink for several days, and then becomes a white or yellow discharge until it ends.  Follow up with your obstetrician as directed:  Do not have sex until your  obstetrician says it is okay. Write down your questions so you remember to ask them during your visits.  Contact your healthcare provider or obstetrician if:   Your bleeding increases, or you have heavy bleeding that soaks a pad in 1 hour for 2 hours in a row.    You pass large blood clots.    You are breathing faster than normal, or your heart is beating faster than normal.    You are urinating less than usual, or not at all.    You feel dizzy.    You have questions or concerns about your condition or care.  Seek immediate care or call 911 if:   You are suddenly short of breath and feel lightheaded.    You have sudden chest pain.  © 2017 [employer] Information is for End User's use only and may not be sold, redistributed or otherwise used for commercial purposes. All illustrations and images included in CareNotes® are the copyrighted property of [employer]. or [employer].  The above information is an  only. It is not intended as medical advice for individual conditions or treatments. Talk to your doctor, nurse or pharmacist before following any medical regimen to see if it is safe and effective for you.      Breast Care for the Breast Feeding Mother   WHAT YOU SHOULD KNOW:   Your breasts will go through normal changes while you are breastfeeding. Sometimes breast and nipple problems can develop while you are breastfeeding. Learn about changes that are normal and those that may be a problem. Breast care can help you prevent and manage problems so you and your baby can enjoy the benefits of breastfeeding.  AFTER YOU LEAVE:   Breast changes while you are breastfeeding:   For the first few days after your baby is born, your body makes a small amount of breast milk (colostrum). Within about 2 to 5 days, your body will begin making mature milk. It may take up to 10 days or longer for mature milk to come in. When your mature milk comes in, your breasts will become full and  firm. They may feel tender.     Breastfeeding your baby will decrease the full feeling in your breasts. You may feel a tingly sensation during feedings as milk is released from your breasts. This is called the milk let-down reflex. After 7 or more days, the fullness may feel like it has decreased. Your nipples should look the same as they did before you started breastfeeding. Breasts that feel full before and empty after breastfeeding are signs that breastfeeding is going well.  Breast problems that can occur while you are breastfeeding:   Nipple soreness  may occur when you begin to breastfeed your baby. You may also have nipple soreness if your baby is not latched on to your breast correctly. Correct positioning and latch-on may decrease or stop the pain in your nipples. Work with your caregivers to help your baby latch on correctly. It may also be helpful to place warm, wet compresses on your nipples to help decrease pain.     Plugged milk ducts  may cause painful breast lumps. Plugged ducts may be caused by not emptying your breasts completely during feedings. When your baby pauses during breastfeeding, massage and gently squeeze your breast. Gentle massage may unplug a blocked milk duct. Pump out any milk left in your breasts after your baby is done breastfeeding. Avoid wearing tight tops, tight bras, or under-wire bras, because they may put pressure on your breasts.    Engorgement  may occur as your milk comes in soon after you begin breastfeeding. Engorgement may cause your breasts to become swollen and painful. Your breasts may also become engorged if you miss a feeding or you do not breastfeed on demand. The best way to decrease engorgement symptoms is to empty your breasts by feeding your baby often. Engorgement can make it hard for your baby to latch on to your breast. If this happens, express a small amount of milk and then have your baby latch on. Cold compresses, gel packs, or ice packs on your breasts  can help decrease pain and swelling. Ask your caregiver how often and how long you should use cold, or ice packs.     A breast infection called mastitis  can develop if you have plugged milk ducts or engorgement. Mastitis causes your breasts to become red, swollen, and painful. You may also have flu-like symptoms, such as chills and a fever. Place heat on your breasts to help decrease the pain. You may want to place a moist, warm cloth on the painful breast or both of your breasts. Ask how often to do this. Your primary healthcare provider (PHP) may suggest that you take an NSAID, such as ibuprofen, to decrease pain and swelling. He may also order antibiotics to treat mastitis. Ask about feeding your baby when you have a breast infection.  How to help prevent or manage breast problems while you are breastfeeding:   Learn how to position your baby and latch him on correctly.  To latch your baby correctly to your breast, make sure that his mouth covers most of your areola (dark area around your nipple). He should not be attached only to the nipple. Your baby is latched on well if you feel comfortable and do not feel pain. A correct latch helps him get enough milk and can help to prevent sore nipples and other breast problems. There are several breastfeeding positions that you can try. Find the position that works best for you and your baby. Ask your caregiver for more information about how to hold and breastfeed your baby.     Prevent biting.  Your baby may get teeth at about 3 to 4 months of age. To help prevent biting, break his suction once he is finished or if he has fallen asleep. To break his suction, slip a finger into the side of his mouth. If your baby bites you, respond with surprise or unhappiness. Offer praise when he does not bite you.     Breastfeed your baby regularly.  Feed your baby 8 to 12 times a day. You may need to wake up your baby at night to feed him. It is okay to feed from 1 or both breasts  at each feeding. Your baby should breastfeed from both breasts equally over the course of a day. If your baby only feeds from 1 side during a feeding, offer your other breast to him first for the next feeding.     Schedule and keep follow-up visits.  Talk to your baby's pediatrician or your PHP during follow-up visits if you have breast problems. Caregivers may suggest that you, or you and your partner, attend classes on breastfeeding. You also may want to join a breastfeeding support group. Caregivers may suggest that you see a lactation consultant. This is a caregiver who can help you with breastfeeding.  Contact your PHP if:   You have a fever and chills.    You have body aches and you feel like you do not have any energy.    One or both of your breasts is red, swollen or hard, painful, and feels warm or hot.    You have breast engorgement that does not get better within 24 hours.     You see or feel a lump in your breast that hurts when you touch it.    You have nipple pain during breastfeeding or between feedings.     Your nipples are red, dry, cracked, or bleeding, or they have scabs on them.     You have questions or concerns about your condition or care.  © 2014 [employer]. Information is for End User's use only and may not be sold, redistributed or otherwise used for commercial purposes. All illustrations and images included in CareNotes® are the copyrighted property of [employer] or "[employer].  The above information is an  only. It is not intended as medical advice for individual conditions or treatments. Talk to your doctor, nurse or pharmacist before following any medical regimen to see if it is safe and effective for you.

## 2024-03-06 VITALS
DIASTOLIC BLOOD PRESSURE: 91 MMHG | OXYGEN SATURATION: 99 % | BODY MASS INDEX: 34.17 KG/M2 | HEART RATE: 84 BPM | RESPIRATION RATE: 18 BRPM | HEIGHT: 61 IN | SYSTOLIC BLOOD PRESSURE: 133 MMHG | TEMPERATURE: 97.7 F | WEIGHT: 181 LBS

## 2024-03-06 LAB
DME PARACHUTE DELIVERY DATE ACTUAL: NORMAL
DME PARACHUTE DELIVERY DATE REQUESTED: NORMAL
DME PARACHUTE ITEM DESCRIPTION: NORMAL
DME PARACHUTE ORDER STATUS: NORMAL
DME PARACHUTE SUPPLIER NAME: NORMAL
DME PARACHUTE SUPPLIER PHONE: NORMAL

## 2024-03-06 PROCEDURE — NC001 PR NO CHARGE: Performed by: NURSE PRACTITIONER

## 2024-03-06 PROCEDURE — 99024 POSTOP FOLLOW-UP VISIT: CPT | Performed by: NURSE PRACTITIONER

## 2024-03-06 RX ORDER — DOCUSATE SODIUM 100 MG/1
100 CAPSULE, LIQUID FILLED ORAL 2 TIMES DAILY
Qty: 60 CAPSULE | Refills: 0 | Status: SHIPPED | OUTPATIENT
Start: 2024-03-06

## 2024-03-06 RX ORDER — ACETAMINOPHEN 325 MG/1
650 TABLET ORAL EVERY 6 HOURS
Qty: 60 TABLET | Refills: 0 | Status: SHIPPED | OUTPATIENT
Start: 2024-03-06 | End: 2024-03-08

## 2024-03-06 RX ORDER — IBUPROFEN 600 MG/1
600 TABLET ORAL EVERY 6 HOURS PRN
Qty: 60 TABLET | Refills: 0 | Status: SHIPPED | OUTPATIENT
Start: 2024-03-06

## 2024-03-06 RX ADMIN — ACETAMINOPHEN 325MG 975 MG: 325 TABLET ORAL at 04:54

## 2024-03-06 RX ADMIN — KETOROLAC TROMETHAMINE 30 MG: 30 INJECTION, SOLUTION INTRAMUSCULAR; INTRAVENOUS at 08:49

## 2024-03-06 RX ADMIN — DOCUSATE SODIUM 100 MG: 100 CAPSULE, LIQUID FILLED ORAL at 08:50

## 2024-03-06 RX ADMIN — ACETAMINOPHEN 325MG 975 MG: 325 TABLET ORAL at 10:36

## 2024-03-06 RX ADMIN — KETOROLAC TROMETHAMINE 30 MG: 30 INJECTION, SOLUTION INTRAMUSCULAR; INTRAVENOUS at 02:23

## 2024-03-06 RX ADMIN — ENOXAPARIN SODIUM 40 MG: 40 INJECTION SUBCUTANEOUS at 08:50

## 2024-03-06 RX ADMIN — IBUPROFEN 600 MG: 600 TABLET ORAL at 14:31

## 2024-03-06 NOTE — PLAN OF CARE
Problem: ANTEPARTUM  Goal: Maintain pregnancy as long as maternal and/or fetal condition is stable  Description: INTERVENTIONS:  - Maternal surveillance  - Fetal surveillance  - Monitor uterine activity  - Medications as ordered  - Bedrest  Outcome: Progressing     Problem: BIRTH - VAGINAL/ SECTION  Goal: Fetal and maternal status remain reassuring during the birth process  Description: INTERVENTIONS:  - Monitor vital signs  - Monitor fetal heart rate  - Monitor uterine activity  - Monitor labor progression (vaginal delivery)  - DVT prophylaxis  - Antibiotic prophylaxis  Outcome: Progressing  Goal: Emotionally satisfying birthing experience for mother/fetus  Description: Interventions:  - Assess, plan, implement and evaluate the nursing care given to the patient in labor  - Advocate the philosophy that each childbirth experience is a unique experience and support the family's chosen level of involvement and control during the labor process   - Actively participate in both the patient's and family's teaching of the birth process  - Consider cultural, Yazidi and age-specific factors and plan care for the patient in labor  Outcome: Progressing     Problem: PAIN - ADULT  Goal: Verbalizes/displays adequate comfort level or baseline comfort level  Description: Interventions:  - Encourage patient to monitor pain and request assistance  - Assess pain using appropriate pain scale  - Administer analgesics based on type and severity of pain and evaluate response  - Implement non-pharmacological measures as appropriate and evaluate response  - Consider cultural and social influences on pain and pain management  - Notify physician/advanced practitioner if interventions unsuccessful or patient reports new pain  Outcome: Progressing     Problem: INFECTION - ADULT  Goal: Absence or prevention of progression during hospitalization  Description: INTERVENTIONS:  - Assess and monitor for signs and symptoms of infection  -  Monitor lab/diagnostic results  - Monitor all insertion sites, i.e. indwelling lines, tubes, and drains  - Monitor endotracheal if appropriate and nasal secretions for changes in amount and color  - [place] appropriate cooling/warming therapies per order  - Administer medications as ordered  - Instruct and encourage patient and family to use good hand hygiene technique  - Identify and instruct in appropriate isolation precautions for identified infection/condition  Outcome: Progressing  Goal: Absence of fever/infection during neutropenic period  Description: INTERVENTIONS:  - Monitor WBC    Outcome: Progressing     Problem: SAFETY ADULT  Goal: Patient will remain free of falls  Description: INTERVENTIONS:  - Educate patient/family on patient safety including physical limitations  - Instruct patient to call for assistance with activity   - Consult OT/PT to assist with strengthening/mobility   - Keep Call bell within reach  - Keep bed low and locked with side rails adjusted as appropriate  - Keep care items and personal belongings within reach  - Initiate and maintain comfort rounds  - Make Fall Risk Sign visible to staff  - Apply yellow socks and bracelet for high fall risk patients  - Consider moving patient to room near nurses station  Outcome: Progressing  Goal: Maintain or return to baseline ADL function  Description: INTERVENTIONS:  -  Assess patient's ability to carry out ADLs; assess patient's baseline for ADL function and identify physical deficits which impact ability to perform ADLs (bathing, care of mouth/teeth, toileting, grooming, dressing, etc.)  - Assess/evaluate cause of self-care deficits   - Assess range of motion  - Assess patient's mobility; develop plan if impaired  - Assess patient's need for assistive devices and provide as appropriate  - Encourage maximum independence but intervene and supervise when necessary  - Involve family in performance of ADLs  - Assess for home care needs following  discharge   - Consider OT consult to assist with ADL evaluation and planning for discharge  - Provide patient education as appropriate  Outcome: Progressing  Goal: Maintains/Returns to pre admission functional level  Description: INTERVENTIONS:  - Perform AM-PAC 6 Click Basic Mobility/ Daily Activity assessment daily.  - Set and communicate daily mobility goal to care team and patient/family/caregiver.   - Collaborate with rehabilitation services on mobility goals if consulted  - Out of bed for toileting  - Record patient progress and toleration of activity level   Outcome: Progressing     Problem: Knowledge Deficit  Goal: Patient/family/caregiver demonstrates understanding of disease process, treatment plan, medications, and discharge instructions  Description: Complete learning assessment and assess knowledge base.  Interventions:  - Provide teaching at level of understanding  - Provide teaching via preferred learning methods  Outcome: Progressing     Problem: DISCHARGE PLANNING  Goal: Discharge to home or other facility with appropriate resources  Description: INTERVENTIONS:  - Identify barriers to discharge w/patient and caregiver  - Arrange for needed discharge resources and transportation as appropriate  - Identify discharge learning needs (meds, wound care, etc.)  - Arrange for interpretive services to assist at discharge as needed  - Refer to Case Management Department for coordinating discharge planning if the patient needs post-hospital services based on physician/advanced practitioner order or complex needs related to functional status, cognitive ability, or social support system  Outcome: Progressing

## 2024-03-06 NOTE — PROGRESS NOTES
POSTPARTUM NOTE  Sandra Fishman 33 y.o. female MRN: [id number]  Unit/Bed#: -01 Encounter: [id number]    Sandra Fishman is a   at postpartum day 2 from a  delivery on 3/4/2024 at 2019.  Baby is at bedside.  Complications included: none    Review of Systems:   -Constitutional: denies issues, ambulating without issue, reports pain currently 7/10   -Breasts: denies tenderness   -Cardiovascular: denies chest pain or SOB   -Gynecologic: lochia light   -Urinary: voiding without issue   -GI: tolerating PO, passing flatus, no bowel movement    Physical Exam:   -Vitals:   Vitals:    24 1900 24 2300 24 0300 24 0700   BP: 118/70 118/78 117/75 123/85   BP Location: Right arm Right arm Right arm    Pulse: 80 77 72 80   Resp: 16 16 16 18   Temp: 97.8 °F (36.6 °C) 98.2 °F (36.8 °C) 98.2 °F (36.8 °C) 97.8 °F (36.6 °C)   TempSrc: Temporal Temporal Temporal Oral   SpO2:    99%   Weight:       Height:          -General: A&Ox3, no acute distress noted   -Pulmonary: normal effort, no SOB noted   -Cardiovascular: regular HR   -Abdomen: soft, non-tender, non-distended, + bowel sounds x4 quadrants, uterine fundus firm @-2 cm below the umbilicus, low transverse incision appears clean/dry/intact with steri strips   -Extremities: nontender, trace BLE edema noted   -Breasts: deferred   -Pelvic exam: deferred    Results from last 7 days   Lab Units 24  0515 24  0714   HEMOGLOBIN g/dL 10.2* 10.9*   HEMATOCRIT % 30.9* 32.8*   MCV fL 93 92       Assessment/Plan:  1. Continue routine postpartum care.  Ambulation and self-care encouraged.  2. Contraception: Mirena IUD.  Will discuss with OB at postpartum visit.  3. Feeding infant via breast.  4. She is Rh positive.  Rhogam is not indicated.  5. Vaccine status:  No vaccines are currently indicated.  6. Continue DVT prophylaxis with SCD use while not ambulating and Lovenox 40mg QD while inpatient.  7. Anticipate discharge to home today.  Will  need to follow up in OBGYN office in 3 weeks.    XU Jurado  3/6/2024

## 2024-03-06 NOTE — LACTATION NOTE
This note was copied from a baby's chart.    In to see dyad today. Mom C/O sore nipples.  Information given about sore nipples and how to correct with positioning techniques. Discussed maneuvers to latch infant on properly to avoid nipple pain and promote healing.  Discussed treatments that could be utilized to promote healing. Hydro gel dressings given with instruction and verbalization of understanding of cleaning and duration of use. Encouraged latch assistance.     Worked on positioning infant up at chest level and starting to feed infant with nose arriving at the nipple. Then, using areolar compression to achieve a deep latch that is comfortable and exchanges optimum amounts of milk.  Mom chose left breast. Encouraged football hold to teach/see deep latch with rocker suckling. At first baby with small quick gape. After a few attempts baby with deep latch and stimulated to keep rocker suckling. Mom noted better suckling, less discomfort with occasional audible swallowing and breast softening. No family at bedside at this time.      03/06/24 0900   Maternal Information   Has mother  before? No   Infant to breast within first hour of birth? Yes  (to breast)   Exclusive Pump and Bottle Feed No   LATCH Documentation   Latch 2   Audible Swallowing 1   Type of Nipple 2   Comfort (Breast/Nipple) 1   Hold (Positioning) 1   LATCH Score 7   Having latch problems? No  (working on consistent deep latch)   Position(s) Used Football   Breasts/Nipples   Left Breast Filling   Right Breast Filling   Left Nipple Everted;Tender   Right Nipple Everted;Tender   Intervention Other (comment);Hand expression;Lanolin;Hydrogel pads  (helped with positioning/maintaining dep latch)   Breastfeeding Progress Not yet established   Other OB Lactation Tools   Feeding Devices Lanolin;Comfort Gels  (LER - Painful Nipples)   Breast Pump   Pump 3  (CM for Medela as per Mom's request)   Patient Follow-Up   Lactation Consult Status 2    Follow-Up Type Inpatient;Call as needed   Other OB Lactation Documentation    Additional Problem Noted Mom C/O Sore Nipples; guided dyad with positioning/maintaining deeper latch  (LER - Painful Nipples)     [facility] MOB  to call for assistance, questions and concerns.  Extension number for inpatient lactation support provided.

## 2024-03-06 NOTE — PLAN OF CARE
Problem: ANTEPARTUM  Goal: Maintain pregnancy as long as maternal and/or fetal condition is stable  Description: INTERVENTIONS:  - Maternal surveillance  - Fetal surveillance  - Monitor uterine activity  - Medications as ordered  - Bedrest  3/6/2024 1524 by Kathy Cornejo RN  Outcome: Completed  3/6/2024 1004 by Kathy Cornejo RN  Outcome: Progressing     Problem: BIRTH - VAGINAL/ SECTION  Goal: Fetal and maternal status remain reassuring during the birth process  Description: INTERVENTIONS:  - Monitor vital signs  - Monitor fetal heart rate  - Monitor uterine activity  - Monitor labor progression (vaginal delivery)  - DVT prophylaxis  - Antibiotic prophylaxis  3/6/2024 1524 by Kathy Cornejo RN  Outcome: Completed  3/6/2024 1004 by Kathy Cornejo RN  Outcome: Progressing  Goal: Emotionally satisfying birthing experience for mother/fetus  Description: Interventions:  - Assess, plan, implement and evaluate the nursing care given to the patient in labor  - Advocate the philosophy that each childbirth experience is a unique experience and support the family's chosen level of involvement and control during the labor process   - Actively participate in both the patient's and family's teaching of the birth process  - Consider cultural, Mormon and age-specific factors and plan care for the patient in labor  3/6/2024 1524 by Kathy Cornejo RN  Outcome: Completed  3/6/2024 1004 by Kathy Cornejo RN  Outcome: Progressing     Problem: PAIN - ADULT  Goal: Verbalizes/displays adequate comfort level or baseline comfort level  Description: Interventions:  - Encourage patient to monitor pain and request assistance  - Assess pain using appropriate pain scale  - Administer analgesics based on type and severity of pain and evaluate response  - Implement non-pharmacological measures as appropriate and evaluate response  - Consider cultural and social influences on pain and pain management  - Notify physician/advanced practitioner if  interventions unsuccessful or patient reports new pain  3/6/2024 1524 by Kathy Cornejo RN  Outcome: Completed  3/6/2024 1004 by Kathy Cornejo RN  Outcome: Progressing     Problem: INFECTION - ADULT  Goal: Absence or prevention of progression during hospitalization  Description: INTERVENTIONS:  - Assess and monitor for signs and symptoms of infection  - Monitor lab/diagnostic results  - Monitor all insertion sites, i.e. indwelling lines, tubes, and drains  - Monitor endotracheal if appropriate and nasal secretions for changes in amount and color  - [place] appropriate cooling/warming therapies per order  - Administer medications as ordered  - Instruct and encourage patient and family to use good hand hygiene technique  - Identify and instruct in appropriate isolation precautions for identified infection/condition  3/6/2024 1524 by Kathy Cornejo RN  Outcome: Completed  3/6/2024 1004 by Kathy Cornejo RN  Outcome: Progressing  Goal: Absence of fever/infection during neutropenic period  Description: INTERVENTIONS:  - Monitor WBC    3/6/2024 1524 by Kathy Cornejo RN  Outcome: Completed  3/6/2024 1004 by Kathy Cornejo RN  Outcome: Progressing     Problem: SAFETY ADULT  Goal: Patient will remain free of falls  Description: INTERVENTIONS:  - Educate patient/family on patient safety including physical limitations  - Instruct patient to call for assistance with activity   - Consult OT/PT to assist with strengthening/mobility   - Keep Call bell within reach  - Keep bed low and locked with side rails adjusted as appropriate  - Keep care items and personal belongings within reach  - Initiate and maintain comfort rounds  - Make Fall Risk Sign visible to staff    - Apply yellow socks and bracelet for high fall risk patients  - Consider moving patient to room near nurses station  3/6/2024 1524 by Kathy Cornejo RN  Outcome: Completed  3/6/2024 1004 by Kathy Cornejo RN  Outcome: Progressing  Goal: Maintain or return to baseline ADL  function  Description: INTERVENTIONS:  -  Assess patient's ability to carry out ADLs; assess patient's baseline for ADL function and identify physical deficits which impact ability to perform ADLs (bathing, care of mouth/teeth, toileting, grooming, dressing, etc.)  - Assess/evaluate cause of self-care deficits   - Assess range of motion  - Assess patient's mobility; develop plan if impaired  - Assess patient's need for assistive devices and provide as appropriate  - Encourage maximum independence but intervene and supervise when necessary  - Involve family in performance of ADLs  - Assess for home care needs following discharge   - Consider OT consult to assist with ADL evaluation and planning for discharge  - Provide patient education as appropriate  3/6/2024 1524 by Kathy Cornejo RN  Outcome: Completed  3/6/2024 1004 by Kathy Cornejo RN  Outcome: Progressing  Goal: Maintains/Returns to pre admission functional level  Description: INTERVENTIONS:  - Perform AM-PAC 6 Click Basic Mobility/ Daily Activity assessment daily.  - Set and communicate daily mobility goal to care team and patient/family/caregiver.   - Collaborate with rehabilitation services on mobility goals if consulted  - Out of bed for toileting  - Record patient progress and toleration of activity level   3/6/2024 1524 by Kathy Cornejo RN  Outcome: Completed  3/6/2024 1004 by Kathy Cornejo RN  Outcome: Progressing     Problem: Knowledge Deficit  Goal: Patient/family/caregiver demonstrates understanding of disease process, treatment plan, medications, and discharge instructions  Description: Complete learning assessment and assess knowledge base.  Interventions:  - Provide teaching at level of understanding  - Provide teaching via preferred learning methods  3/6/2024 1524 by Kathy Corenjo RN  Outcome: Completed  3/6/2024 1004 by Kathy Cornejo RN  Outcome: Progressing     Problem: DISCHARGE PLANNING  Goal: Discharge to home or other facility with appropriate  resources  Description: INTERVENTIONS:  - Identify barriers to discharge w/patient and caregiver  - Arrange for needed discharge resources and transportation as appropriate  - Identify discharge learning needs (meds, wound care, etc.)  - Arrange for interpretive services to assist at discharge as needed  - Refer to Case Management Department for coordinating discharge planning if the patient needs post-hospital services based on physician/advanced practitioner order or complex needs related to functional status, cognitive ability, or social support system  3/6/2024 1524 by Kathy Cornejo RN  Outcome: Completed  3/6/2024 1004 by Kathy Cornejo, RN  Outcome: Progressing

## 2024-03-06 NOTE — CASE MANAGEMENT
Case Management Progress Note    Patient name Sandra Fishman  Location /-01 MRN [id number]  : 1990 Date 3/6/2024       LOS (days): 2  Geometric Mean LOS (GMLOS) (days):   Days to GMLOS:        OBJECTIVE:        Current admission status: Inpatient  Preferred Pharmacy:   [facility]/pharm[facility] #[id number] - [place] [address] [place] [address]  Phone: [phone] Fax: [phone]    [facility] #[id number] - [place] - [address]  [address] [address]  Phone: [phone] Fax: [phone]    Primary Care Provider: No primary care provider on file.    Primary Insurance: [employer]  Secondary Insurance:     PROGRESS NOTE:    CM responded to CM consult for Medela breast pump. Order placed via [place], approved, and delivered to bedside. Signed delivery ticket in folder for liaison.

## 2024-03-07 ENCOUNTER — TELEPHONE (OUTPATIENT)
Dept: OBGYN CLINIC | Facility: CLINIC | Age: 34
End: 2024-03-07

## 2024-03-07 NOTE — UTILIZATION REVIEW
"Mother and baby discharge on 2024     NOTIFICATION OF INPATIENT ADMISSION   MATERNITY/DELIVERY AUTHORIZATION REQUEST   SERVICING FACILITY:   [facility]  Parent Child Health - L&D, [place], NICU  [address] [place] [address]  Tax ID: [id number]  NPI: [id number] ATTENDING PROVIDER:  Attending Name and NPI#: Alisha Fraire Md [[id number]]  Address: [address] [place] [address]  Phone: [phone]     ADMISSION INFORMATION:  Place of Service: Inpatient [facility]  Place of Service Code: 21  Inpatient Admission Date/Time: 3/4/24  6:44 AM  Discharge Date/Time: 3/6/2024  4:08 PM  Admitting Diagnosis Code/Description:  Amniotic fluid leaking [O42.90]   Mother: Sandra Fishman 1990 Estimated Date of Delivery: 3/10/24  Delivering clinician: Alisha Fraire    OB History          1    Para   1    Term   1       0    AB   0    Living   1         SAB   0    IAB   0    Ectopic   0    Multiple   0    Live Births   1           Obstetric Comments   Menarche: 16    Menses: /regular pad every 3 hours             [place] Name & MRN:   Information for the patient's :  Kye, Baby Boy (Sandra) [[id number]]    Delivery Information:  Sex: male  Delivered 3/4/2024 8:19 PM by , Low Transverse; Gestational Age: 39w1d    [place] Measurements:  Weight: 6 lb 9.5 oz (2990 g);  Height: 17.5\"    APGAR 1 minute 5 minutes 10 minutes   Totals: 9 9       UTILIZATION REVIEW CONTACT:  Chani Ojeda Utilization   Network Utilization Review Department  Phone: [phone]  Fax [phone]  Email: [email or web address]  Contact for approvals/pending authorizations, clinical reviews, and discharge.   PHYSICIAN ADVISORY SERVICES:  Medical Necessity Denial & Rjrd-kg-Csjd Review  Phone: [phone]  Fax: [phone]  Email: [email or web address]   DISCHARGE SUPPORT TEAM:  For Patients Discharge Needs & " Updates  Phone: [phone] opt. 2 Fax: [phone]  Email: [email or web address]       Detail Level: Detailed Quality 110: Preventive Care And Screening: Influenza Immunization: Influenza Immunization previously received during influenza season Quality 226: Preventive Care And Screening: Tobacco Use: Screening And Cessation Intervention: Patient screened for tobacco use and is an ex/non-smoker

## 2024-03-07 NOTE — TELEPHONE ENCOUNTER
"POSTPARTUM PHONE CALL ASSESSMENT    Date of Delivery: 3/4/2024  Delivering Provider: Dr. Fraire  Mode:   Delivery Notes/Complications: Category 2 FHT   Do you still have bleeding/pain? yes  If so, how much/how severe? Light bleeding and mild discomfort in hip area   Regular BMs/Urination? yes  Breastfeeding/Formula/Both? Breastfeeding-Patient states she feels the baby is not eating as much. Has an appt today with pediatrician.  Patient also has Baby and Me contact information.  How are you doing emotionally? \"I'm good\"    EPDS Score: 0  Do you have any other questions or concerns for us or your provider? no   Have you scheduled the pediatrician appointment with pediatrician? yes  Do you have a postpartum visit scheduled? yes   Date scheduled: 3/27/2024 Provider: Dr. Fraire    "

## 2024-03-12 LAB — PLACENTA IN STORAGE: NORMAL

## 2024-03-25 ENCOUNTER — OFFICE VISIT (OUTPATIENT)
Dept: OBGYN CLINIC | Facility: CLINIC | Age: 34
End: 2024-03-25
Payer: COMMERCIAL

## 2024-03-25 VITALS
HEIGHT: 61 IN | SYSTOLIC BLOOD PRESSURE: 128 MMHG | WEIGHT: 159.6 LBS | DIASTOLIC BLOOD PRESSURE: 74 MMHG | BODY MASS INDEX: 30.13 KG/M2

## 2024-03-25 DIAGNOSIS — Z30.09 ENCOUNTER FOR GENERAL COUNSELING AND ADVICE ON CONTRACEPTIVE MANAGEMENT: Primary | ICD-10-CM

## 2024-03-25 PROCEDURE — 99212 OFFICE O/P EST SF 10 MIN: CPT | Performed by: NURSE PRACTITIONER

## 2024-03-25 NOTE — PROGRESS NOTES
"Assessment/Plan:    1. Encounter for general counseling and advice on contraceptive management  Reviewed all options for contraception given that she is breast feeding.  Explained need to avoid estrogen containing methods so as not to disrupt her lactation.  Reviewed all progestin only options of the pill, injection, implant and levonorgestrel IUDs; also reviewed Paragard IUD.  While she originally desired an IUD, she does not want to wait 9 more weeks.  She chooses to proceed with Nexplanon implant.  Discussed potential bleeding pattern of irregular/unexpected bleeding.  She has an appt for insertion 3/27/24.        Subjective:      Patient ID: Sandra Fishman is a 33 y.o. female.    HPI  PROBLEM VISIT    34 yo .  She is post  of 3/4/24 and presents to today to discuss contraception.  States that her original plan was to have an IUD inserted, however since she had a C/S this could not be done immediately after birth and the earliest she may have it inserted is 24 (12w post birth).  She is breast feeding    She would like to discuss all of her contraceptive options.      The following portions of the patient's history were reviewed and updated as appropriate: allergies, current medications, past family history, past medical history, past social history, past surgical history, and problem list.    Review of Systems   Constitutional:  Negative for chills and fever.   Genitourinary: Negative.        Objective:    /74 (BP Location: Right arm, Patient Position: Sitting, Cuff Size: Standard)   Ht 5' 1\" (1.549 m)   Wt 72.4 kg (159 lb 9.6 oz)   LMP 2024   Breastfeeding Yes   BMI 30.16 kg/m²      Physical Exam  Constitutional:       General: She is not in acute distress.     Appearance: Normal appearance. She is not ill-appearing or diaphoretic.      Comments: Bmi 30.2   HENT:      Head: Normocephalic and atraumatic.   Eyes:      Pupils: Pupils are equal, round, and reactive to light. "   Pulmonary:      Effort: Pulmonary effort is normal.   Skin:     General: Skin is warm and dry.   Neurological:      General: No focal deficit present.      Mental Status: She is alert and oriented to person, place, and time.   Psychiatric:         Mood and Affect: Mood normal.         Behavior: Behavior normal.         Thought Content: Thought content normal.         Judgment: Judgment normal.

## 2024-03-27 ENCOUNTER — PROCEDURE VISIT (OUTPATIENT)
Dept: OBGYN CLINIC | Facility: CLINIC | Age: 34
End: 2024-03-27
Payer: COMMERCIAL

## 2024-03-27 ENCOUNTER — POSTPARTUM VISIT (OUTPATIENT)
Dept: OBGYN CLINIC | Facility: CLINIC | Age: 34
End: 2024-03-27
Payer: COMMERCIAL

## 2024-03-27 VITALS
OXYGEN SATURATION: 98 % | HEART RATE: 94 BPM | HEIGHT: 61 IN | SYSTOLIC BLOOD PRESSURE: 116 MMHG | BODY MASS INDEX: 30.02 KG/M2 | WEIGHT: 159 LBS | DIASTOLIC BLOOD PRESSURE: 80 MMHG

## 2024-03-27 DIAGNOSIS — Z30.017 NEXPLANON INSERTION: ICD-10-CM

## 2024-03-27 DIAGNOSIS — Z98.891 STATUS POST PRIMARY LOW TRANSVERSE CESAREAN SECTION: Primary | ICD-10-CM

## 2024-03-27 DIAGNOSIS — Z01.812 PRE-PROCEDURE LAB EXAM: Primary | ICD-10-CM

## 2024-03-27 LAB — SL AMB POCT URINE HCG: NEGATIVE

## 2024-03-27 PROCEDURE — 81025 URINE PREGNANCY TEST: CPT | Performed by: NURSE PRACTITIONER

## 2024-03-27 PROCEDURE — 11981 INSERTION DRUG DLVR IMPLANT: CPT | Performed by: NURSE PRACTITIONER

## 2024-03-27 NOTE — PROGRESS NOTES
"Subjective    Patient is a 32 yo  who presents today s/p a primary  delivery on 3/4/24 in the setting of a persistent category 2 FHT. She feels well today and has no complaints. Her bleeding is light and intermittent. She is pumping and occasionally giving formula. She is interested in the Nexplanon for contraception.    OB History          1    Para   1    Term   1       0    AB   0    Living   1         SAB   0    IAB   0    Ectopic   0    Multiple   0    Live Births   1           Obstetric Comments   Menarche: 16    Menses: 28/6/regular pad every 3 hours                      Objective    Vitals:    24 1534   BP: 116/80   BP Location: Left arm   Patient Position: Sitting   Cuff Size: Standard   Pulse: 94   SpO2: 98%   Weight: 72.1 kg (159 lb)   Height: 5' 1\" (1.549 m)        Physical Exam  Constitutional:       Appearance: Normal appearance.   HENT:      Head: Normocephalic and atraumatic.   Cardiovascular:      Rate and Rhythm: Normal rate.   Pulmonary:      Effort: Pulmonary effort is normal. No respiratory distress.   Abdominal:      Palpations: Abdomen is soft.      Tenderness: There is no abdominal tenderness.      Comments: Pfannenstiel incision C/D/I   Musculoskeletal:         General: Normal range of motion.   Neurological:      Mental Status: She is alert.   Skin:     General: Skin is warm and dry.          Assessment    Patient Active Problem List   Diagnosis    Obstruction of fallopian tube    Female infertility    Seasonal allergies    Status post primary low transverse  section    Asthma during pregnancy    Asthma affecting pregnancy in third trimester    Hx of Incarceration during pregnancy    Premature rupture of membranes    Anemia        Plan   - EPDS 0  - Nexplanon insertion to follow appointment  "

## 2024-03-27 NOTE — PROGRESS NOTES
Universal Protocol:  Consent: Written consent obtained.  Risks and benefits: risks, benefits and alternatives were discussed  Consent given by: patient  Patient understanding: patient states understanding of the procedure being performed  Patient consent: the patient's understanding of the procedure matches consent given  Procedure consent: procedure consent matches procedure scheduled  Relevant documents: relevant documents present and verified  Site marked: the operative site was marked  Patient identity confirmed: verbally with patient  Remove and insert drug implant    Date/Time: 3/27/2024 3:45 PM    Performed by: XU Lassiter  Authorized by: XU Lassiter    Indication:     Indication: Insertion of non-biodegradable drug delivery implant    Pre-procedure:     Prepped with: povidone-iodine      Local anesthetic:  Lidocaine 1%    The site was cleaned and prepped in a sterile fashion: yes    Procedure:     Procedure:  Insertion    Small stab incision was made in arm: yes      Left/right:  Left    Preloaded contraceptive capsule trocar was placed subdermally: yes      Visualization of implant was obtained: yes      Contraceptive capsule was inserted and trocar removed: yes      Visualization of notch in stylet and palpation of device: yes      Palpation confirms placement by provider and patient: yes      Site was closed with steri-strips and pressure bandage applied: yes    Comments:      RV 2w

## 2025-06-07 NOTE — ED PROVIDER NOTES
Goal Outcome Evaluation:                   Pt currently resting in bed. No s/s of acute distress noted at this time. Pt had c/o pain, PRN medications given. Isolation precautions initiated. Plan of care ongoing.                            History  Chief Complaint   Patient presents with   • Eye Pain     Pt states she woke up with a "puffy" right eye lid     29-year-old female is presenting with swelling above her right eye  Patient states that she has been following with [facility] for sight due to a chalazion that has been causing her some discomfort for the past several months  Over the past few days, the patient now says that she is having significant worsening of her swelling above her right thigh which is now red and warm to the touch and has an area of outpouching in the inferior aspect with white skin coloration overlying  He is denying any change in her vision, pain with eye movements, double vision, fever, chills, or drainage/crusting of the eye  Prior to Admission Medications   Prescriptions Last Dose Informant Patient Reported? Taking?    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   • Melena 12/21/2012   • Ovarian cyst        Past Surgical History:   Procedure Laterality Date   • BREAST SURGERY Left 08/28/2008    lesion excision-fibroadenoma   • WISDOM TOOTH EXTRACTION         Family History   Problem Relation Age of Onset   • No Known Problems Mother    • No Known Problems Father    • No Known Problems Brother    • No Known Problems Maternal Grandmother    • Hypertension Maternal Grandfather    • Diabetes Maternal Grandfather    • Asthma Paternal Grandmother    • Hypertension Paternal Grandmother    • Hypertension Paternal Grandfather    • No Known Problems Brother    • Breast cancer Other    • Breast cancer Maternal Aunt 32        unsure of BRCA status   • Ovarian cancer Neg Hx    • Colon cancer Neg Hx      I have reviewed and agree with the history as documented  E-Cigarette/Vaping   • E-Cigarette Use Never User      E-Cigarette/Vaping Substances   • Nicotine No    • THC No    • CBD No    • Flavoring No    • Other No    • Unknown No      Social History     Tobacco Use   • Smoking status: Never   • Smokeless tobacco: Never   Vaping Use   • Vaping Use: Never used   Substance Use Topics   • Alcohol use: Yes     Alcohol/week: 1 0 - 2 0 standard drink     Types: 1 - 2 Glasses of wine per week     Comment: occ  • Drug use: Not Currently     Types: Marijuana     Comment: marijuana in high schoool        Review of Systems   Constitutional: Negative for chills and fever  HENT: Negative for ear pain and sore throat  Eyes: Negative for pain, discharge and visual disturbance  Swelling above right eye in the eyelid   Respiratory: Negative for cough and shortness of breath  Cardiovascular: Negative for chest pain and palpitations  Gastrointestinal: Negative for abdominal pain and vomiting  Genitourinary: Negative for dysuria and hematuria  Musculoskeletal: Negative for arthralgias and back pain  Skin: Negative for color change and rash  Neurological: Negative for seizures and syncope  All other systems reviewed and are negative  Physical Exam  ED Triage Vitals [01/27/23 0857]   Temperature Pulse Respirations Blood Pressure SpO2   98 5 °F (36 9 °C) 93 18 113/72 98 %      Temp Source Heart Rate Source Patient Position - Orthostatic VS BP Location FiO2 (%)   Oral Monitor Sitting Right arm --      Pain Score       --             Orthostatic Vital Signs  Vitals:    01/27/23 0857   BP: 113/72   Pulse: 93   Patient Position - Orthostatic VS: Sitting       Physical Exam  Vitals and nursing note reviewed  Constitutional:       General: She is not in acute distress  Appearance: She is well-developed  She is not toxic-appearing  HENT:      Head: Normocephalic and atraumatic        Right Ear: External ear normal       Left Ear: External ear normal       Nose: Nose normal  No congestion or rhinorrhea  Mouth/Throat:      Mouth: Mucous membranes are moist       Pharynx: Oropharynx is clear  No oropharyngeal exudate or posterior oropharyngeal erythema  Eyes:      Extraocular Movements: Extraocular movements intact  Right eye: Normal extraocular motion and no nystagmus  Left eye: Normal extraocular motion and no nystagmus  Conjunctiva/sclera: Conjunctivae normal       Pupils: Pupils are equal, round, and reactive to light  Cardiovascular:      Rate and Rhythm: Normal rate and regular rhythm  Pulses: Normal pulses  Pulmonary:      Effort: Pulmonary effort is normal    Abdominal:      General: Abdomen is flat  There is no distension  Palpations: Abdomen is soft  Tenderness: There is no abdominal tenderness  Musculoskeletal:         General: No swelling or tenderness  Cervical back: Neck supple  No rigidity  Lymphadenopathy:      Cervical: No cervical adenopathy  Skin:     General: Skin is warm and dry  Capillary Refill: Capillary refill takes less than 2 seconds  Coloration: Skin is not jaundiced  Findings: No rash  Neurological:      General: No focal deficit present  Mental Status: She is alert and oriented to person, place, and time  Mental status is at baseline     Psychiatric:         Mood and Affect: Mood normal          Behavior: Behavior normal          ED Medications  Medications   clindamycin (CLEOCIN) capsule 300 mg (300 mg Oral Given 1/27/23 0952)   lidocaine (PF) (XYLOCAINE-MPF) 1 % injection 5 mL (5 mL Infiltration Given by Other 1/27/23 6348)       Diagnostic Studies  Results Reviewed     None                 No orders to display         Procedures  Incision and drain    Date/Time: 1/27/2023 10:00 AM  Performed by: Ermelinda Rice MD  Authorized by: Ermelinda Rice MD   Universal Protocol:  Procedure performed by:  Consent: Verbal consent obtained  Consent given by: patient  Patient understanding: patient states understanding of the procedure being performed  Patient consent: the patient's understanding of the procedure matches consent given  Site marked: the operative site was marked  Patient identity confirmed: verbally with patient      Patient location:  ED  Location:     Type:  Abscess    Size:  0 5cm    Location:  Head/neck    Head/neck location:  R eyelid  Anesthesia (see MAR for exact dosages): Anesthesia method:  Local infiltration    Local anesthetic:  Lidocaine 1% w/o epi  Procedure details:     Complexity:  Simple    Needle aspiration: no      Incision types:  Stab incision    Aspiration type: puncture aspiration      Aspiration type comment:  18g    Incision depth:  Subcutaneous    Drainage:  Purulent    Drainage amount:  Scant    Wound treatment:  Wound left open  Post-procedure details:     Patient tolerance of procedure: Tolerated well, no immediate complications          ED Course                                       Medical Decision Making  This is a 29-year-old female with ongoing right eye swelling and redness with new area concerning for developing abscess  No red flags for deeper space infection, likely periorbital/preseptal cellulitis on top of chalazion  Will attempt incision and drainage  Reassessment/disposition: Patient elevated drainage well  Was able to express greater than 1 cc of purulent fluid from abscess area  Surrounding preseptal cellulitis will be treated with clindamycin due to concern for MRSA  Patient was given strict return precautions for pain with EOM, pressure-like sensations behind the eye, significant worsening of the swelling or pain, inability to see, loss of visual acuity, or double vision    Patient rest understanding of these return precautions and agreed to otherwise follow-up with [facility] for sight to obtain an earlier appointment due to her severe new symptoms  Periorbital cellulitis: complicated acute illness or injury  Risk  Prescription drug management  Disposition  Final diagnoses:   Periorbital cellulitis     Time reflects when diagnosis was documented in both MDM as applicable and the Disposition within this note     Time User Action Codes Description Comment    1/27/2023  9:28 AM Zuhairtricia Hickman Marleni [[id number]] Periorbital cellulitis       ED Disposition     ED Disposition   Discharge    Condition   Stable    Date/Time   Fri Jan 27, 2023  9:59 AM    Comment   Lelo Mercer discharge to home/self care  Follow-up Information     Follow up With Specialties Details Why Contact Info Additional [address] Emergency Department Emergency Medicine Go to  If symptoms worsen, As needed [address] [id number]  [address] [facility] [address] Emergency Department, [address], [address] [facility]   Call to discuss today's visit and find out if you should be seen sooner [address]  [phone]           Discharge Medication List as of 1/27/2023 10:00 AM      START taking these medications    Details   clindamycin (CLEOCIN) 150 mg capsule Take 2 capsules (300 mg total) by mouth every 8 (eight) hours for 7 days, Starting Fri 1/27/2023, Until Fri 2/3/2023, Normal         CONTINUE these medications which have NOT CHANGED    Details   cetirizine (ZyrTEC) 10 mg tablet Take 1 tablet by mouth daily, Starting Wed 11/22/2017, Until Fri 4/23/2021, Print      Multiple Vitamins-Minerals (MULTIVITAMIN ADULT PO) Take by mouth, Historical Med           No discharge procedures on file  PDMP Review     None           ED Provider  Attending physically available and evaluated Lelo Mercer I managed the patient along with the ED Attending      Electronically Signed by Giselle Catalan MD  01/27/23 0787

## (undated) DEVICE — LARGE, DISPOSABLE ALEXIS O C-SECTION PROTECTOR - RETRACTOR: Brand: ALEXIS ® O C-SECTION PROTECTOR - RETRACTOR

## (undated) DEVICE — 3M™ STERI-STRIP™ REINFORCED ADHESIVE SKIN CLOSURES, R1542, 1/4 IN X 1-1/2 IN (6 MM X 38 MM), 6 STRIPS/ENVELOPE: Brand: 3M™ STERI-STRIP™

## (undated) DEVICE — ABDOMINAL PAD: Brand: DERMACEA